# Patient Record
Sex: FEMALE | Race: WHITE | NOT HISPANIC OR LATINO | Employment: OTHER | URBAN - METROPOLITAN AREA
[De-identification: names, ages, dates, MRNs, and addresses within clinical notes are randomized per-mention and may not be internally consistent; named-entity substitution may affect disease eponyms.]

---

## 2017-01-26 ENCOUNTER — HOSPITAL ENCOUNTER (EMERGENCY)
Facility: CLINIC | Age: 62
Discharge: HOME | End: 2017-01-26
Attending: EMERGENCY MEDICINE | Admitting: EMERGENCY MEDICINE
Payer: COMMERCIAL

## 2017-01-26 VITALS
RESPIRATION RATE: 16 BRPM | HEIGHT: 65 IN | HEART RATE: 88 BPM | TEMPERATURE: 97.8 F | DIASTOLIC BLOOD PRESSURE: 79 MMHG | OXYGEN SATURATION: 100 % | SYSTOLIC BLOOD PRESSURE: 144 MMHG

## 2017-01-26 DIAGNOSIS — R55 SYNCOPE, UNSPECIFIED SYNCOPE TYPE: ICD-10-CM

## 2017-01-26 LAB
ALBUMIN UR-MCNC: NEGATIVE MG/DL
ANION GAP SERPL CALCULATED.3IONS-SCNC: 7 MMOL/L (ref 3–14)
APPEARANCE UR: CLEAR
BASOPHILS # BLD AUTO: 0 10E9/L (ref 0–0.2)
BASOPHILS NFR BLD AUTO: 0.1 %
BILIRUB UR QL STRIP: NEGATIVE
BUN SERPL-MCNC: 12 MG/DL (ref 7–30)
CALCIUM SERPL-MCNC: 8.6 MG/DL (ref 8.5–10.1)
CHLORIDE SERPL-SCNC: 102 MMOL/L (ref 94–109)
CO2 SERPL-SCNC: 28 MMOL/L (ref 20–32)
COLOR UR AUTO: NORMAL
CREAT SERPL-MCNC: 0.81 MG/DL (ref 0.52–1.04)
DIFFERENTIAL METHOD BLD: NORMAL
EOSINOPHIL # BLD AUTO: 0 10E9/L (ref 0–0.7)
EOSINOPHIL NFR BLD AUTO: 0.4 %
ERYTHROCYTE [DISTWIDTH] IN BLOOD BY AUTOMATED COUNT: 12.4 % (ref 10–15)
GFR SERPL CREATININE-BSD FRML MDRD: 72 ML/MIN/1.7M2
GLUCOSE SERPL-MCNC: 111 MG/DL (ref 70–99)
GLUCOSE UR STRIP-MCNC: NEGATIVE MG/DL
HCT VFR BLD AUTO: 41.8 % (ref 35–47)
HGB BLD-MCNC: 14.5 G/DL (ref 11.7–15.7)
HGB UR QL STRIP: NEGATIVE
IMM GRANULOCYTES # BLD: 0 10E9/L (ref 0–0.4)
IMM GRANULOCYTES NFR BLD: 0.1 %
INTERPRETATION ECG - MUSE: NORMAL
KETONES UR STRIP-MCNC: NEGATIVE MG/DL
LEUKOCYTE ESTERASE UR QL STRIP: NEGATIVE
LYMPHOCYTES # BLD AUTO: 2.3 10E9/L (ref 0.8–5.3)
LYMPHOCYTES NFR BLD AUTO: 33.3 %
MCH RBC QN AUTO: 31.5 PG (ref 26.5–33)
MCHC RBC AUTO-ENTMCNC: 34.7 G/DL (ref 31.5–36.5)
MCV RBC AUTO: 91 FL (ref 78–100)
MONOCYTES # BLD AUTO: 0.3 10E9/L (ref 0–1.3)
MONOCYTES NFR BLD AUTO: 4.3 %
NEUTROPHILS # BLD AUTO: 4.2 10E9/L (ref 1.6–8.3)
NEUTROPHILS NFR BLD AUTO: 61.8 %
NITRATE UR QL: NEGATIVE
NRBC # BLD AUTO: 0 10*3/UL
NRBC BLD AUTO-RTO: 0 /100
PH UR STRIP: 7 PH (ref 5–7)
PLATELET # BLD AUTO: 190 10E9/L (ref 150–450)
POTASSIUM SERPL-SCNC: 4.1 MMOL/L (ref 3.4–5.3)
RBC # BLD AUTO: 4.6 10E12/L (ref 3.8–5.2)
SODIUM SERPL-SCNC: 137 MMOL/L (ref 133–144)
SP GR UR STRIP: 1 (ref 1–1.03)
TSH SERPL DL<=0.005 MIU/L-ACNC: 1.49 MU/L (ref 0.4–4)
URN SPEC COLLECT METH UR: NORMAL
UROBILINOGEN UR STRIP-MCNC: NORMAL MG/DL (ref 0–2)
WBC # BLD AUTO: 6.8 10E9/L (ref 4–11)

## 2017-01-26 PROCEDURE — 96360 HYDRATION IV INFUSION INIT: CPT

## 2017-01-26 PROCEDURE — 93005 ELECTROCARDIOGRAM TRACING: CPT

## 2017-01-26 PROCEDURE — 25000128 H RX IP 250 OP 636: Performed by: EMERGENCY MEDICINE

## 2017-01-26 PROCEDURE — 84443 ASSAY THYROID STIM HORMONE: CPT | Performed by: EMERGENCY MEDICINE

## 2017-01-26 PROCEDURE — 80048 BASIC METABOLIC PNL TOTAL CA: CPT | Performed by: EMERGENCY MEDICINE

## 2017-01-26 PROCEDURE — 99284 EMERGENCY DEPT VISIT MOD MDM: CPT | Mod: 25

## 2017-01-26 PROCEDURE — 85025 COMPLETE CBC W/AUTO DIFF WBC: CPT | Performed by: EMERGENCY MEDICINE

## 2017-01-26 PROCEDURE — 81003 URINALYSIS AUTO W/O SCOPE: CPT | Performed by: EMERGENCY MEDICINE

## 2017-01-26 RX ADMIN — SODIUM CHLORIDE 1000 ML: 9 INJECTION, SOLUTION INTRAVENOUS at 07:04

## 2017-01-26 NOTE — ED AVS SNAPSHOT
Emergency Department    [address] [facility] [address]    Phone:  [phone]    Fax:  [phone]                                       Tatianna Leo   MRN: [id number]    Department:   Emergency Department   Date of Visit:  1/26/2017           After Visit Summary Signature Page     I have received my discharge instructions, and my questions have been answered. I have discussed any challenges I see with this plan with the nurse or doctor.    ..........................................................................................................................................  Patient/Patient Representative Signature      ..........................................................................................................................................  Patient Representative Print Name and Relationship to Patient    ..................................................               ................................................  Date                                            Time    ..........................................................................................................................................  Reviewed by Signature/Title    ...................................................              ..............................................  Date                                                            Time

## 2017-01-26 NOTE — DISCHARGE INSTRUCTIONS
Causes of Syncope  Syncope (fainting) has many causes. Sometimes it is not serious. In other cases, syncope is a sign of a heart problem. But treatment can help    When syncope is not serious  Your doctor may call your problem vasovagal syncope or orthostatic hypotension. These 2 types of syncope are not serious. They can be caused by:    Strong feelings, such as anxiety or fear. A nerve signal may briefly change your heart rate and lower your blood pressure too much.    Standing for too long. Standing may cause blood to pool in your legs. When this happens, your brain may not receive all the blood it needs.    Standing up too quickly. Your blood pressure may not adjust fast enough to changes in posture and may drop too low. Certain medications can also cause this problem.  When heart trouble causes syncope  A heart problem can decrease the amount of oxygen-rich blood that reaches the brain. Heart trouble can be serious and may even be fatal if left untreated:    A slow heart rate. Electrical signals tell the chambers of the heart when to pump. But the signals may be slowed or blocked (heart block) as they travel on the heart s pathways. This can be caused by aging, scarred heart tissue, or damage from heart disease. When the heart rate slows, not enough blood is pumped.    A fast heart rate. Certain problems can make the heart race. For instance, after a heart attack, also known as acute myocardial infarction, or AMI, abnormal electrical signals may be created. These signals can make the heart suddenly beat very fast. The heart pumps before the chambers can fill with blood. So less blood reaches the brain and other parts of the body. Illegal drugs, certain medications, heart disease, or an inherited condition can also cause this.    A heart valve problem. Blood travels through the chambers of the heart as it is pumped. Heart valves open and close to help move blood in the right direction. But a valve may not open  or close fully, if it s hardened or scarred. As a result, less blood is pumped through the heart to the brain and body.    2575-6308 [employer]. [address] [place] [address], [place] [address]. All rights reserved. This information is not intended as a substitute for professional medical care. Always follow your healthcare professional's instructions.          Fainting: Uncertain Cause  Fainting (syncope) is a temporary loss of consciousness. It s also called passing out. It occurs when blood flow to the brain is less than normal. Near-fainting (near-syncope) is very similar to fainting, but you don t fully pass out.  Common minor causes of fainting include:    Sudden fear    Pain    Nausea    Emotional stress    Overexertion  Suddenly standing up after sitting or lying for a long time can also cause fainting.  More serious causes of fainting include:    Very slow or very fast heartbeat (arrhythmia)    Other types of heart disease    Dehydration    Loss of blood loss    Seizure    Stroke    Ruptured blood vessel in the brain  Taking too much high blood pressure medicine can also cause low blood pressure and fainting.  Your health care provider does not know the exact cause of your fainting. But the tests today did not show any of the serious causes of fainting. Sometimes you may need more tests to find out if you have a serious problem. That s why it s important to follow up with your provider as advised.  Home care  Follow these guidelines when caring for yourself at home:    Rest today. You may go back to your normal activities when you are feeling back to normal. It is best to stay with someone who can check on you for the next 24 hours to watch for another episode of fainting.    If you become lightheaded or dizzy, lie down right away. Or sit with your head between your knees.    Because the provider doesn t know the exact cause of your fainting or near-fainting spell, it s possible for you to have  another spell without warning. Because of this, don t drive a car or operate dangerous equipment. Don t take a bath alone. Use a shower instead. Don t swim alone until your health care provider says that you are no longer in danger of having another fainting spell.  Follow-up care  Follow up with your health care provider, or as advised.  When to seek medical advice  Call your health care provider right away if any of these occur:    Another fainting spell that s not explained by the common causes listed above    Pain in your chest, arm, neck, jaw, back, or abdomen    Shortness of breath    Severe headache or seizure    Blood in vomit or stools (black or red color)    Unexpected vaginal bleeding    Your heart beats very rapidly, very slowly, or irregularly (palpitations)  Also call your provider if you have signs of stroke:    Weakness in an arm or leg or on one side of the face    Difficulty speaking or seeing    Extreme drowsiness, confusion, dizziness, or fainting    4435-4861 [employer]. [address] [place] [address], [place] [address]. All rights reserved. This information is not intended as a substitute for professional medical care. Always follow your healthcare professional's instructions.

## 2017-01-26 NOTE — ED AVS SNAPSHOT
Emergency Department    [address] [facility] [address]    Phone:  [phone]    Fax:  [phone]                                       Tatianna Leo   MRN: [id number]    Department:   Emergency Department   Date of Visit:  1/26/2017           Patient Information     Date Of Birth          1955        Your diagnoses for this visit were:     Syncope, unspecified syncope type        You were seen by Reta Cooper MD.      Follow-up Information     Follow up with Rekha, Jeremy Marcial.    Contact information:    [address] [place] [id number] [phone]          Follow up with  Emergency Department.    Specialty:  EMERGENCY MEDICINE    Why:  If symptoms worsen    Contact information:    [address] [facility] [id number] [phone]        Discharge Instructions         Causes of Syncope  Syncope (fainting) has many causes. Sometimes it is not serious. In other cases, syncope is a sign of a heart problem. But treatment can help    When syncope is not serious  Your doctor may call your problem vasovagal syncope or orthostatic hypotension. These 2 types of syncope are not serious. They can be caused by:    Strong feelings, such as anxiety or fear. A nerve signal may briefly change your heart rate and lower your blood pressure too much.    Standing for too long. Standing may cause blood to pool in your legs. When this happens, your brain may not receive all the blood it needs.    Standing up too quickly. Your blood pressure may not adjust fast enough to changes in posture and may drop too low. Certain medications can also cause this problem.  When heart trouble causes syncope  A heart problem can decrease the amount of oxygen-rich blood that reaches the brain. Heart trouble can be serious and may even be fatal if left untreated:    A slow heart rate. Electrical signals tell the chambers of the heart when to pump. But the signals may be slowed or  blocked (heart block) as they travel on the heart s pathways. This can be caused by aging, scarred heart tissue, or damage from heart disease. When the heart rate slows, not enough blood is pumped.    A fast heart rate. Certain problems can make the heart race. For instance, after a heart attack, also known as acute myocardial infarction, or AMI, abnormal electrical signals may be created. These signals can make the heart suddenly beat very fast. The heart pumps before the chambers can fill with blood. So less blood reaches the brain and other parts of the body. Illegal drugs, certain medications, heart disease, or an inherited condition can also cause this.    A heart valve problem. Blood travels through the chambers of the heart as it is pumped. Heart valves open and close to help move blood in the right direction. But a valve may not open or close fully, if it s hardened or scarred. As a result, less blood is pumped through the heart to the brain and body.    3569-4115 [employer]. [address] [place] [address]. All rights reserved. This information is not intended as a substitute for professional medical care. Always follow your healthcare professional's instructions.          Fainting: Uncertain Cause  Fainting (syncope) is a temporary loss of consciousness. It s also called passing out. It occurs when blood flow to the brain is less than normal. Near-fainting (near-syncope) is very similar to fainting, but you don t fully pass out.  Common minor causes of fainting include:    Sudden fear    Pain    Nausea    Emotional stress    Overexertion  Suddenly standing up after sitting or lying for a long time can also cause fainting.  More serious causes of fainting include:    Very slow or very fast heartbeat (arrhythmia)    Other types of heart disease    Dehydration    Loss of blood loss    Seizure    Stroke    Ruptured blood vessel in the brain  Taking too much high blood pressure medicine  can also cause low blood pressure and fainting.  Your health care provider does not know the exact cause of your fainting. But the tests today did not show any of the serious causes of fainting. Sometimes you may need more tests to find out if you have a serious problem. That s why it s important to follow up with your provider as advised.  Home care  Follow these guidelines when caring for yourself at home:    Rest today. You may go back to your normal activities when you are feeling back to normal. It is best to stay with someone who can check on you for the next 24 hours to watch for another episode of fainting.    If you become lightheaded or dizzy, lie down right away. Or sit with your head between your knees.    Because the provider doesn t know the exact cause of your fainting or near-fainting spell, it s possible for you to have another spell without warning. Because of this, don t drive a car or operate dangerous equipment. Don t take a bath alone. Use a shower instead. Don t swim alone until your health care provider says that you are no longer in danger of having another fainting spell.  Follow-up care  Follow up with your health care provider, or as advised.  When to seek medical advice  Call your health care provider right away if any of these occur:    Another fainting spell that s not explained by the common causes listed above    Pain in your chest, arm, neck, jaw, back, or abdomen    Shortness of breath    Severe headache or seizure    Blood in vomit or stools (black or red color)    Unexpected vaginal bleeding    Your heart beats very rapidly, very slowly, or irregularly (palpitations)  Also call your provider if you have signs of stroke:    Weakness in an arm or leg or on one side of the face    Difficulty speaking or seeing    Extreme drowsiness, confusion, dizziness, or fainting    5747-4432 [employer]. [address] [place] [address], [place] [address]. All rights reserved. This  information is not intended as a substitute for professional medical care. Always follow your healthcare professional's instructions.          24 Hour Appointment Hotline       To make an appointment at any [facility], call [phone] ([phone]). If you don't have a family doctor or clinic, we will help you find one. [place] clinics are conveniently located to serve the needs of you and your family.             Review of your medicines      Our records show that you are taking the medicines listed below. If these are incorrect, please call your family doctor or clinic.        Dose / Directions Last dose taken    ALEVE PO        Refills:  0        MULTIVITAMIN GUMMIES ADULT PO        Refills:  0        TYLENOL PO        Refills:  0                Procedures and tests performed during your visit     Basic metabolic panel    CBC with platelets differential    EKG 12-lead, tracing only    TSH with free T4 reflex    UA reflex to Microscopic and Culture      Orders Needing Specimen Collection     None      Pending Results     No orders found from 1/25/2017 to 1/27/2017.            Pending Culture Results     No orders found from 1/25/2017 to 1/27/2017.       Test Results from your hospital stay           1/26/2017  7:11 AM - Interface, iTagged Results      Component Results     Component Value Ref Range & Units Status    WBC 6.8 4.0 - 11.0 10e9/L Final    RBC Count 4.60 3.8 - 5.2 10e12/L Final    Hemoglobin 14.5 11.7 - 15.7 g/dL Final    Hematocrit 41.8 35.0 - 47.0 % Final    MCV 91 78 - 100 fl Final    MCH 31.5 26.5 - 33.0 pg Final    MCHC 34.7 31.5 - 36.5 g/dL Final    RDW 12.4 10.0 - 15.0 % Final    Platelet Count 190 150 - 450 10e9/L Final    Diff Method Automated Method  Final    % Neutrophils 61.8 % Final    % Lymphocytes 33.3 % Final    % Monocytes 4.3 % Final    % Eosinophils 0.4 % Final    % Basophils 0.1 % Final    % Immature Granulocytes 0.1 % Final    Nucleated RBCs 0 0 /100 Final    Absolute  Neutrophil 4.2 1.6 - 8.3 10e9/L Final    Absolute Lymphocytes 2.3 0.8 - 5.3 10e9/L Final    Absolute Monocytes 0.3 0.0 - 1.3 10e9/L Final    Absolute Eosinophils 0.0 0.0 - 0.7 10e9/L Final    Absolute Basophils 0.0 0.0 - 0.2 10e9/L Final    Abs Immature Granulocytes 0.0 0 - 0.4 10e9/L Final    Absolute Nucleated RBC 0.0  Final         1/26/2017  7:28 AM - Interface, Flexilab Results      Component Results     Component Value Ref Range & Units Status    Sodium 137 133 - 144 mmol/L Final    Potassium 4.1 3.4 - 5.3 mmol/L Final    Chloride 102 94 - 109 mmol/L Final    Carbon Dioxide 28 20 - 32 mmol/L Final    Anion Gap 7 3 - 14 mmol/L Final    Glucose 111 (H) 70 - 99 mg/dL Final    Urea Nitrogen 12 7 - 30 mg/dL Final    Creatinine 0.81 0.52 - 1.04 mg/dL Final    GFR Estimate 72 >60 mL/min/1.7m2 Final    Non  GFR Calc    GFR Estimate If Black 87 >60 mL/min/1.7m2 Final    African American GFR Calc    Calcium 8.6 8.5 - 10.1 mg/dL Final         1/26/2017  7:56 AM - Interface, Flexilab Results      Component Results     Component Value Ref Range & Units Status    Color Urine Light Yellow  Final    Appearance Urine Clear  Final    Glucose Urine Negative NEG mg/dL Final    Bilirubin Urine Negative NEG Final    Ketones Urine Negative NEG mg/dL Final    Specific Gravity Urine 1.004 1.003 - 1.035 Final    Blood Urine Negative NEG Final    pH Urine 7.0 5.0 - 7.0 pH Final    Protein Albumin Urine Negative NEG mg/dL Final    Urobilinogen mg/dL Normal 0.0 - 2.0 mg/dL Final    Nitrite Urine Negative NEG Final    Leukocyte Esterase Urine Negative NEG Final    Source Midstream Urine  Final         1/26/2017  7:36 AM - Interface, Flexilab Results      Component Results     Component Value Ref Range & Units Status    TSH 1.49 0.40 - 4.00 mU/L Final                Clinical Quality Measure: Blood Pressure Screening     Your blood pressure was checked while you were in the emergency department today. The last reading we  "obtained was  BP: 144/79 mmHg . Please read the guidelines below about what these numbers mean and what you should do about them.  If your systolic blood pressure (the top number) is less than 120 and your diastolic blood pressure (the bottom number) is less than 80, then your blood pressure is normal. There is nothing more that you need to do about it.  If your systolic blood pressure (the top number) is 120-139 or your diastolic blood pressure (the bottom number) is 80-89, your blood pressure may be higher than it should be. You should have your blood pressure rechecked within a year by a primary care provider.  If your systolic blood pressure (the top number) is 140 or greater or your diastolic blood pressure (the bottom number) is 90 or greater, you may have high blood pressure. High blood pressure is treatable, but if left untreated over time it can put you at risk for heart attack, stroke, or kidney failure. You should have your blood pressure rechecked by a primary care provider within the next 4 weeks.  If your provider in the emergency department today gave you specific instructions to follow-up with your doctor or provider even sooner than that, you should follow that instruction and not wait for up to 4 weeks for your follow-up visit.        Thank you for choosing [facility]       Thank you for choosing [facility] for your care. Our goal is always to provide you with excellent care. Hearing back from our patients is one way we can continue to improve our services. Please take a few minutes to complete the written survey that you may receive in the mail after you visit with us. Thank you!        [employer] Information     [employer] lets you send messages to your doctor, view your test results, renew your prescriptions, schedule appointments and more. To sign up, go to [email or web address] . Click on \"Log in\" on the left side of the screen, which will take you to the Welcome page. Then click on \"Sign up Now\" on " the right side of the page.     You will be asked to enter the access code listed below, as well as some personal information. Please follow the directions to create your username and password.     Your access code is: [email or web address]  Expires: 2017  8:03 AM     Your access code will  in 90 days. If you need help or a new code, please call your [facility] clinic or [phone].        Care EveryWhere ID     This is your Care EveryWhere ID. This could be used by other organizations to access your [facility] medical records  [id number]        After Visit Summary       This is your record. Keep this with you and show to your community pharmacist(s) and doctor(s) at your next visit.

## 2017-01-26 NOTE — ED PROVIDER NOTES
"  History     Chief Complaint:  Loss of Consciousness      HPI   Tatianna Leo is a 61 year old female who presents with loss of consciousness. 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 The patient does not remember the episode. The patient's co-worker states that the patient did not hit her head and sat down and had her head down on a table after the episode and looked pale, was shaking, had an irregular pulse, and cold skin. The patient also kept asking \"what happened?\" per co-worker. The patient notes that she had a similar about 14 months ago when she was outside and woke up on the ground feeling fine afterwards.  She denies headache, chest pain, abdominal pain or back pain prior to or following syncope episode.  Per bystander no post ictal period and no convulsions noted.      Allergies:  Amoxicillin - swelling     Medications:    Multivitamin  Vitamin D  Probiotic  Naproxen  Acetaminophen     Past Medical History:    History reviewed.  No significant past medical history.     Past Surgical History:    Hysterectomy  Cholecystectomy    Family History:    History reviewed. No pertinent family history.    Social History:  Marital Status:   Presents to the ED with co-worker  Tobacco Use: 1 pack daily  Alcohol Use: No     Review of Systems   Skin: Positive for pallor.   Neurological: Positive for syncope.   All other systems reviewed and are negative.    Physical Exam   First Vitals:  BP: (!) 149/115 mmHg  Heart Rate: 88   Resp: 13  SpO2: 95% RA  Temp: 97.8  F (oral)        Physical Exam  General: Patient is alert and normal appearing.  HEENT: Head atraumatic    Eyes: pupils equal and reactive. Conjunctiva clear   Nares: patent   Oropharynx: no lesions, uvula midline, no palatal draping, normal voice, no trismus  Neck: Supple without lymphadenopathy, no meningismus  Chest: Heart regular rate and rhythm. "   Lungs: Equal clear to auscultation with no wheeze or rales  Abdomen: Soft, non tender, nondistended, normal bowel sounds  Back: No costovertebral angle tenderness, no midline C, T or L spine tenderness  Neuro: Grossly nonfocal, normal speech, strength equal bilaterally, CN 2-12 intact  Extremities: No deformities, equal radial and DP pulses. No clubbing, cyanosis.  No edema  Skin: Warm and dry with no rash.       Emergency Department Course   ECG:  @ 0654  Indication: loss of consciousness  Vent. Rate 67 bpm. NJ interval 144 ms. QRS duration 82 ms. QT/QTc 392/414 ms. P-R-T axis 83 90 71.   Normal sinus rhythm. Rightward axis. Pulmonary disease pattern. Abnormal ECG.   Read @ 0707 by Dr. Cooper.    Laboratory:  CBC: WBC 6.8, HGB 14.5, , WNL  BMP: Glc (H), Rest WNL (Creatinine 0.81)  TSH with free T4 reflex: 1.49    UA: Clear light yellow urine, WNL     Interventions:  (0704) Normal Saline, 1 liter, IV bolus     ED Course:  Nursing notes and past medical history reviewed.   I performed a physical examination of the patient as documented above.  I explained the plan with the patient who consents to this.   EKG was done, interpretation as above.  Blood was drawn from the patient. This was sent for laboratory testing, findings above.   Urine sample was obtained and sent for laboratory analysis, findings above.  (7333) Rechecked the patient.  Findings and plan explained to the patient. Patient discharged home with instructions regarding supportive care, medications, and reasons to return. The importance of close follow-up was reviewed.     Impression & Plan    Medical Decision Making:  Tatianna Leo is a 61 year old female who presents to the emergency department after having a syncopal episode at work. 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 She states that she got to work feeling fine, woke up feeling fine, and had not eaten breakfast but had coffee and water. Report from bystander states that she put  her head down on the table but she does not recall feeling dizzy or light headed and then them helping her to the ground after she passed out. She passed out for a matter of seconds and then woke up. There was no postictal, no seizure like activity. She states that she is back to her baseline and feels completely normal now. She has had one similar episode to this in the past. Again, with minimal if any kind of prodrome. The nurse that come to evaluate her thought that she had an irregular heart rhythm but while here has exhibited a normal heart rate and regular rhythm. Hemodynamically she has been stable, she feels like she is at baseline. Her urinalysis, CBC, BNP are all within acceptable limits as is her TSH. Given the report of an irregular heart rhythm, she was offered a Holter monitor but declines that. She has had no exertional syncope that she can identify. No dysrhythmia seen. I do not hear any murmur on her examination to suggest aortic stenosis or cause of syncope. This is not consistent with neurogenic from aneurysm based on history and examination or stroke or seizure. I think this patient is safe for discharge at this time. She is undergoing an extensive work up with her primary doctor for weight loss that was unintentional and has had recent mammogram as scheduled for colonoscopy and recent laboratory testing. I asked her to follow up with her doctor if she changes her mind about wearing a Holter monitor, certainly if she has further syncopal episodes in the next week or two she should return for reevaluation and if she ever has exertional syncope she should be seen immediately. She expressed agreement and understanding and all questions and concerns were addressed.    Diagnosis:    ICD-10-CM    1. Syncope, unspecified syncope type R55        Disposition:   Discharge to home.      Kaylynn VILLA, ana serving as a scribe on 1/26/2017 at 6:54 AM to personally document services performed by Reta Cooper  MD Amanda, based on my observations and the provider's statements to me.      Reta Cooper MD  01/26/17 [id number]

## 2021-05-16 ENCOUNTER — APPOINTMENT (OUTPATIENT)
Dept: CT IMAGING | Facility: CLINIC | Age: 66
End: 2021-05-16
Attending: PHYSICIAN ASSISTANT
Payer: COMMERCIAL

## 2021-05-16 ENCOUNTER — HOSPITAL ENCOUNTER (EMERGENCY)
Facility: CLINIC | Age: 66
Discharge: HOME | End: 2021-05-16
Attending: PHYSICIAN ASSISTANT | Admitting: PHYSICIAN ASSISTANT
Payer: COMMERCIAL

## 2021-05-16 VITALS
TEMPERATURE: 97.9 F | BODY MASS INDEX: 21.34 KG/M2 | HEIGHT: 64 IN | DIASTOLIC BLOOD PRESSURE: 77 MMHG | RESPIRATION RATE: 16 BRPM | OXYGEN SATURATION: 94 % | WEIGHT: 125 LBS | HEART RATE: 90 BPM | SYSTOLIC BLOOD PRESSURE: 168 MMHG

## 2021-05-16 DIAGNOSIS — S22.31XA CLOSED FRACTURE OF ONE RIB OF RIGHT SIDE, INITIAL ENCOUNTER: ICD-10-CM

## 2021-05-16 LAB
CREAT BLD-MCNC: 0.8 MG/DL (ref 0.52–1.04)
GFR SERPL CREATININE-BSD FRML MDRD: 72 ML/MIN/{1.73_M2}

## 2021-05-16 PROCEDURE — 96374 THER/PROPH/DIAG INJ IV PUSH: CPT | Mod: 59

## 2021-05-16 PROCEDURE — 250N000011 HC RX IP 250 OP 636: Performed by: PHYSICIAN ASSISTANT

## 2021-05-16 PROCEDURE — 250N000009 HC RX 250: Performed by: PHYSICIAN ASSISTANT

## 2021-05-16 PROCEDURE — 82565 ASSAY OF CREATININE: CPT

## 2021-05-16 PROCEDURE — 71260 CT THORAX DX C+: CPT

## 2021-05-16 PROCEDURE — 99285 EMERGENCY DEPT VISIT HI MDM: CPT | Mod: 25

## 2021-05-16 RX ORDER — KETOROLAC TROMETHAMINE 15 MG/ML
15 INJECTION, SOLUTION INTRAMUSCULAR; INTRAVENOUS ONCE
Status: COMPLETED | OUTPATIENT
Start: 2021-05-16 | End: 2021-05-16

## 2021-05-16 RX ORDER — OXYCODONE HYDROCHLORIDE 5 MG/1
5 TABLET ORAL EVERY 6 HOURS PRN
Qty: 6 TABLET | Refills: 0 | Status: SHIPPED | OUTPATIENT
Start: 2021-05-16 | End: 2022-08-22

## 2021-05-16 RX ORDER — IOPAMIDOL 755 MG/ML
80 INJECTION, SOLUTION INTRAVASCULAR ONCE
Status: COMPLETED | OUTPATIENT
Start: 2021-05-16 | End: 2021-05-16

## 2021-05-16 RX ORDER — LIDOCAINE 50 MG/G
1 PATCH TOPICAL EVERY 24 HOURS
Qty: 10 PATCH | Refills: 0 | Status: SHIPPED | OUTPATIENT
Start: 2021-05-16 | End: 2021-05-26

## 2021-05-16 RX ADMIN — KETOROLAC TROMETHAMINE 15 MG: 15 INJECTION, SOLUTION INTRAMUSCULAR; INTRAVENOUS at 13:57

## 2021-05-16 RX ADMIN — IOPAMIDOL 80 ML: 755 INJECTION, SOLUTION INTRAVENOUS at 14:11

## 2021-05-16 RX ADMIN — SODIUM CHLORIDE 70 ML: 9 INJECTION, SOLUTION INTRAVENOUS at 14:11

## 2021-05-16 ASSESSMENT — ENCOUNTER SYMPTOMS
SHORTNESS OF BREATH: 1
ROS SKIN COMMENTS: NO OPEN WOUND
ABDOMINAL PAIN: 0

## 2021-05-16 ASSESSMENT — MIFFLIN-ST. JEOR: SCORE: 1107

## 2021-05-16 NOTE — ED TRIAGE NOTES
Was out fishing on a boat while sitting on captain's chair, chair broke while casting fishing pole and fell hit right flank area on a bench with metal frame. Since having right rib pain.

## 2021-05-16 NOTE — ED PROVIDER NOTES
"  History   Chief Complaint:  Rib Pain     HPI   Tatianna Leo is a 65 year old female who presents with rib pain. Earlier today, the patient was sitting in a jory on a fishing boat when she cast her pole and her chair broke. She fell to the side and hit her right side on a metal bench. She denies any head trauma or abrasions. Since this incident, she has had trouble breathing and moving due to pain on her right side. She denies any abdominal pain.     Review of Systems   Respiratory: Positive for shortness of breath (due to pain).    Gastrointestinal: Negative for abdominal pain.   Musculoskeletal:        Right sided rip pain  No head trauma   Skin:        No open wound   All other systems reviewed and are negative.    Allergies:  Amoxicillin    Medications:  Aleve    Past Medical History:    Actinic keratosis  Moderate vulvar dysplasia  Tobacco use disorder     Past Surgical History:    Cholecystectomy  Hysterectomy  DEANNA and BSO  Leep procedure  Tubal ligation  Appendectomy    Family History:    Brother: Colon cancer, Heart attack, Alcoholism, Diabetes  Father: Lung cancer  Mother: Diabetes, Heart attack  Sister: Breast cancer, Heart attack, Colon cancer, Diabetes    Social History:  The patient was unaccompanied to the ED.    Physical Exam     Patient Vitals for the past 24 hrs:   BP Temp Temp src Pulse Resp SpO2 Height Weight   05/16/21 1310 (!) 168/77 97.9  F (36.6  C) Oral 90 16 94 % 1.626 m (5' 4\") 56.7 kg (125 lb)       Physical Exam  General: Alert and interactive. Appears well. Cooperative and pleasant.   Eyes: The pupils are equal and round. EOMs intact. No scleral icterus.  ENT: No abnormalities to the external nose or ears. Mucous membranes moist. Posterior oropharynx is non-erythematous.      Neck: Trachea is in the midline. No nuchal rigidity.     CV: Regular rate and rhythm. S1 and S2 normal without murmur, click, gallop or rub.   Resp: Breath sounds are clear bilaterally, without rhonchi, " wheezes, rales. Non-labored, no retractions or accessory muscle use.     GI: Abdomen is soft without distension. No tenderness to palpation. No peritoneal signs.    MS: Moving all extremities well. Good muscle tone. No tenderness to cervical, thoracic, or lumbar spine process. Tenderness to the inferior aspect of the right rib margin; no flail chest.   Skin: Warm and dry. No rash or lesions noted.  Neuro: Alert and oriented x 3. No focal neurologic deficits. Good strength and sensation in upper and lower extremities. Psych: Awake. Alert.  Normal affect. Appropriate interactions.  Lymph: No anterior or posterior cervical lymphadenopathy noted.    Emergency Department Course   Imaging:  Chest CT w IV contrast only, TRAUMA / DISSECTION  1.  Age indeterminant nondisplaced fracture of the right lateral  inferior 10th rib. No pneumothorax or effusion.  2.  Mild coronary artery calcifications.  Reading per radiology    Laboratory:  Creatinine POCT (Collected 1403): Creatinine 0.8, GFR 72    Emergency Department Course:  Reviewed:  I reviewed nursing notes, vitals, past medical history and care everywhere    Assessments:  1336 I obtained history and examined the patient as noted above.     1455 I rechecked and updated the patient regarding the imaging results, laboratory results and the plan for care.    Interventions:  1357 Toradol 15mg IV    Disposition:  The patient was discharged to home.     Impression & Plan   Medical Decision Making:  Tatianna Leo is a 65 year old female who presents for evaluation of right flank pain after falling off of the Captain's Chair and landing on a metal surface. She is having difficulty breathing and pain with movements and deep breaths. On examination, the patient has tenderness along the inferior right rib margin. There is no significant ecchymosis, lacerations, abrasions, or signs of flail chest. CT of the chest is obtained that shows no signs of displaced fracture, pneumothorax,  or hemothorax. She does have an age-indeterminate nondisplaced fracture of the 10th rib. I will send her home with an incentive spirometer as well as lidocaine patches to use for pain. She was given a small prescription for oxycodone to use for breakthrough pain. I discussed the healing process with rib fractures, and that she may have pain for an extended period of time. She will follow with primary care this week for recheck to ensure improvement. Patient is vitally stable at this time without any hypoxia, and she is stable for discharge home. She will return for worsening breathing, increased pain, persistent vomiting, hemoptysis, or other concerns.    Diagnosis:    ICD-10-CM    1. Closed fracture of one rib of right side, initial encounter  S22.31XA        Discharge Medications:  New Prescriptions    LIDOCAINE (LIDODERM) 5 % PATCH    Place 1 patch onto the skin every 24 hours for 10 days    OXYCODONE (ROXICODONE) 5 MG TABLET    Take 1 tablet (5 mg) by mouth every 6 hours as needed for pain       Scribe Disclosure:  I, Yaya Barrera, am serving as a scribe at 1:32 PM on 5/16/2021 to document services personally performed by Kandice Chen PA based on my observations and the provider's statements to me.      Kandice Chen PA-C  05/16/21 2329

## 2021-05-16 NOTE — DISCHARGE INSTRUCTIONS
Continue using Ibuprofen/Tylenol for pain.   Use Oxycodone as needed for breakthrough pain.   Use Lidocaine patches for pain as well.

## 2021-09-03 ENCOUNTER — HOSPITAL ENCOUNTER (INPATIENT)
Facility: CLINIC | Age: 66
LOS: 2 days | Discharge: HOME | End: 2021-09-05
Attending: EMERGENCY MEDICINE | Admitting: INTERNAL MEDICINE
Payer: COMMERCIAL

## 2021-09-03 ENCOUNTER — APPOINTMENT (OUTPATIENT)
Dept: GENERAL RADIOLOGY | Facility: CLINIC | Age: 66
End: 2021-09-03
Attending: EMERGENCY MEDICINE
Payer: COMMERCIAL

## 2021-09-03 DIAGNOSIS — R06.89 RESPIRATORY INSUFFICIENCY: ICD-10-CM

## 2021-09-03 DIAGNOSIS — J44.1 COPD EXACERBATION (H): ICD-10-CM

## 2021-09-03 LAB
ALBUMIN SERPL-MCNC: 3.8 G/DL (ref 3.4–5)
ALBUMIN UR-MCNC: 50 MG/DL
ALP SERPL-CCNC: 63 U/L (ref 40–150)
ALT SERPL W P-5'-P-CCNC: 33 U/L (ref 0–50)
ANION GAP SERPL CALCULATED.3IONS-SCNC: 6 MMOL/L (ref 3–14)
APPEARANCE UR: CLEAR
AST SERPL W P-5'-P-CCNC: 29 U/L (ref 0–45)
ATRIAL RATE - MUSE: 117 BPM
BASE EXCESS BLDV CALC-SCNC: 0.5 MMOL/L (ref -7.7–1.9)
BASOPHILS # BLD AUTO: 0.1 10E3/UL (ref 0–0.2)
BASOPHILS NFR BLD AUTO: 0 %
BILIRUB SERPL-MCNC: 0.7 MG/DL (ref 0.2–1.3)
BILIRUB UR QL STRIP: NEGATIVE
BUN SERPL-MCNC: 20 MG/DL (ref 7–30)
C PNEUM DNA SPEC QL NAA+PROBE: NOT DETECTED
CALCIUM SERPL-MCNC: 8.8 MG/DL (ref 8.5–10.1)
CHLORIDE BLD-SCNC: 101 MMOL/L (ref 94–109)
CO2 SERPL-SCNC: 29 MMOL/L (ref 20–32)
COLOR UR AUTO: YELLOW
CREAT SERPL-MCNC: 0.68 MG/DL (ref 0.52–1.04)
DIASTOLIC BLOOD PRESSURE - MUSE: NORMAL MMHG
EOSINOPHIL # BLD AUTO: 0 10E3/UL (ref 0–0.7)
EOSINOPHIL NFR BLD AUTO: 0 %
ERYTHROCYTE [DISTWIDTH] IN BLOOD BY AUTOMATED COUNT: 11.9 % (ref 10–15)
FLUAV H1 2009 PAND RNA SPEC QL NAA+PROBE: NOT DETECTED
FLUAV H1 RNA SPEC QL NAA+PROBE: NOT DETECTED
FLUAV H3 RNA SPEC QL NAA+PROBE: NOT DETECTED
FLUAV RNA SPEC QL NAA+PROBE: NOT DETECTED
FLUBV RNA SPEC QL NAA+PROBE: NOT DETECTED
GFR SERPL CREATININE-BSD FRML MDRD: >90 ML/MIN/1.73M2
GLUCOSE BLD-MCNC: 111 MG/DL (ref 70–99)
GLUCOSE UR STRIP-MCNC: 300 MG/DL
HADV DNA SPEC QL NAA+PROBE: NOT DETECTED
HCO3 BLDV-SCNC: 27 MMOL/L (ref 21–28)
HCO3 BLDV-SCNC: 30 MMOL/L (ref 21–28)
HCOV PNL SPEC NAA+PROBE: NOT DETECTED
HCT VFR BLD AUTO: 44.2 % (ref 35–47)
HGB BLD-MCNC: 15.4 G/DL (ref 11.7–15.7)
HGB UR QL STRIP: NEGATIVE
HMPV RNA SPEC QL NAA+PROBE: NOT DETECTED
HOLD SPECIMEN: NORMAL
HOLD SPECIMEN: NORMAL
HPIV1 RNA SPEC QL NAA+PROBE: NOT DETECTED
HPIV2 RNA SPEC QL NAA+PROBE: NOT DETECTED
HPIV3 RNA SPEC QL NAA+PROBE: NOT DETECTED
HPIV4 RNA SPEC QL NAA+PROBE: NOT DETECTED
HYALINE CASTS: 3 /LPF
IMM GRANULOCYTES # BLD: 0.1 10E3/UL
IMM GRANULOCYTES NFR BLD: 1 %
INTERPRETATION ECG - MUSE: NORMAL
KETONES UR STRIP-MCNC: 60 MG/DL
LACTATE BLD-SCNC: 1.1 MMOL/L
LEUKOCYTE ESTERASE UR QL STRIP: ABNORMAL
LYMPHOCYTES # BLD AUTO: 2.5 10E3/UL (ref 0.8–5.3)
LYMPHOCYTES NFR BLD AUTO: 14 %
M PNEUMO DNA SPEC QL NAA+PROBE: NOT DETECTED
MAGNESIUM SERPL-MCNC: 2.9 MG/DL (ref 1.6–2.3)
MCH RBC QN AUTO: 30.4 PG (ref 26.5–33)
MCHC RBC AUTO-ENTMCNC: 34.8 G/DL (ref 31.5–36.5)
MCV RBC AUTO: 87 FL (ref 78–100)
MONOCYTES # BLD AUTO: 1.3 10E3/UL (ref 0–1.3)
MONOCYTES NFR BLD AUTO: 7 %
MUCOUS THREADS #/AREA URNS LPF: PRESENT /LPF
NEUTROPHILS # BLD AUTO: 14.4 10E3/UL (ref 1.6–8.3)
NEUTROPHILS NFR BLD AUTO: 78 %
NITRATE UR QL: NEGATIVE
NRBC # BLD AUTO: 0 10E3/UL
NRBC BLD AUTO-RTO: 0 /100
NT-PROBNP SERPL-MCNC: 101 PG/ML (ref 0–900)
O2/TOTAL GAS SETTING VFR VENT: 100 %
P AXIS - MUSE: 89 DEGREES
PCO2 BLDV: 49 MM HG (ref 40–50)
PCO2 BLDV: 56 MM HG (ref 40–50)
PH BLDV: 7.34 [PH] (ref 7.32–7.43)
PH BLDV: 7.35 [PH] (ref 7.32–7.43)
PH UR STRIP: 6 [PH] (ref 5–7)
PLATELET # BLD AUTO: 230 10E3/UL (ref 150–450)
PO2 BLDV: 44 MM HG (ref 25–47)
PO2 BLDV: 96 MM HG (ref 25–47)
POTASSIUM BLD-SCNC: 4.1 MMOL/L (ref 3.4–5.3)
PR INTERVAL - MUSE: 116 MS
PROT SERPL-MCNC: 7.8 G/DL (ref 6.8–8.8)
QRS DURATION - MUSE: 74 MS
QT - MUSE: 290 MS
QTC - MUSE: 404 MS
R AXIS - MUSE: 98 DEGREES
RBC # BLD AUTO: 5.06 10E6/UL (ref 3.8–5.2)
RBC URINE: 7 /HPF
RSV RNA SPEC QL NAA+PROBE: NOT DETECTED
RSV RNA SPEC QL NAA+PROBE: NOT DETECTED
RV+EV RNA SPEC QL NAA+PROBE: NOT DETECTED
SAO2 % BLDV: 76 % (ref 94–100)
SARS-COV-2 RNA RESP QL NAA+PROBE: NEGATIVE
SODIUM SERPL-SCNC: 136 MMOL/L (ref 133–144)
SP GR UR STRIP: 1.03 (ref 1–1.03)
SYSTOLIC BLOOD PRESSURE - MUSE: NORMAL MMHG
T AXIS - MUSE: 77 DEGREES
TROPONIN I SERPL-MCNC: <0.015 UG/L (ref 0–0.04)
UROBILINOGEN UR STRIP-MCNC: NORMAL MG/DL
VENTRICULAR RATE- MUSE: 117 BPM
WBC # BLD AUTO: 18.3 10E3/UL (ref 4–11)
WBC URINE: 44 /HPF

## 2021-09-03 PROCEDURE — 96366 THER/PROPH/DIAG IV INF ADDON: CPT

## 2021-09-03 PROCEDURE — 250N000009 HC RX 250: Performed by: EMERGENCY MEDICINE

## 2021-09-03 PROCEDURE — C9803 HOPD COVID-19 SPEC COLLECT: HCPCS

## 2021-09-03 PROCEDURE — 94640 AIRWAY INHALATION TREATMENT: CPT

## 2021-09-03 PROCEDURE — 120N000013 HC R&B IMCU

## 2021-09-03 PROCEDURE — 87633 RESP VIRUS 12-25 TARGETS: CPT | Performed by: INTERNAL MEDICINE

## 2021-09-03 PROCEDURE — 258N000003 HC RX IP 258 OP 636: Performed by: INTERNAL MEDICINE

## 2021-09-03 PROCEDURE — 87635 SARS-COV-2 COVID-19 AMP PRB: CPT | Performed by: EMERGENCY MEDICINE

## 2021-09-03 PROCEDURE — 250N000011 HC RX IP 250 OP 636: Performed by: INTERNAL MEDICINE

## 2021-09-03 PROCEDURE — 87040 BLOOD CULTURE FOR BACTERIA: CPT | Performed by: EMERGENCY MEDICINE

## 2021-09-03 PROCEDURE — 94640 AIRWAY INHALATION TREATMENT: CPT | Mod: 76

## 2021-09-03 PROCEDURE — 94660 CPAP INITIATION&MGMT: CPT

## 2021-09-03 PROCEDURE — 81001 URINALYSIS AUTO W/SCOPE: CPT | Performed by: INTERNAL MEDICINE

## 2021-09-03 PROCEDURE — 36415 COLL VENOUS BLD VENIPUNCTURE: CPT | Performed by: EMERGENCY MEDICINE

## 2021-09-03 PROCEDURE — 250N000011 HC RX IP 250 OP 636: Performed by: EMERGENCY MEDICINE

## 2021-09-03 PROCEDURE — 5A09357 ASSISTANCE WITH RESPIRATORY VENTILATION, LESS THAN 24 CONSECUTIVE HOURS, CONTINUOUS POSITIVE AIRWAY PRESSURE: ICD-10-PCS | Performed by: INTERNAL MEDICINE

## 2021-09-03 PROCEDURE — 250N000013 HC RX MED GY IP 250 OP 250 PS 637: Performed by: INTERNAL MEDICINE

## 2021-09-03 PROCEDURE — 99291 CRITICAL CARE FIRST HOUR: CPT | Mod: 25

## 2021-09-03 PROCEDURE — 87086 URINE CULTURE/COLONY COUNT: CPT | Performed by: INTERNAL MEDICINE

## 2021-09-03 PROCEDURE — 96365 THER/PROPH/DIAG IV INF INIT: CPT

## 2021-09-03 PROCEDURE — 93005 ELECTROCARDIOGRAM TRACING: CPT

## 2021-09-03 PROCEDURE — 999N000157 HC STATISTIC RCP TIME EA 10 MIN

## 2021-09-03 PROCEDURE — 71045 X-RAY EXAM CHEST 1 VIEW: CPT

## 2021-09-03 PROCEDURE — 36415 COLL VENOUS BLD VENIPUNCTURE: CPT | Performed by: INTERNAL MEDICINE

## 2021-09-03 PROCEDURE — 99223 1ST HOSP IP/OBS HIGH 75: CPT | Mod: AI | Performed by: INTERNAL MEDICINE

## 2021-09-03 PROCEDURE — 96367 TX/PROPH/DG ADDL SEQ IV INF: CPT

## 2021-09-03 PROCEDURE — 258N000001 HC RX 258: Performed by: INTERNAL MEDICINE

## 2021-09-03 PROCEDURE — 85025 COMPLETE CBC W/AUTO DIFF WBC: CPT | Performed by: EMERGENCY MEDICINE

## 2021-09-03 PROCEDURE — 83735 ASSAY OF MAGNESIUM: CPT | Performed by: INTERNAL MEDICINE

## 2021-09-03 PROCEDURE — 96375 TX/PRO/DX INJ NEW DRUG ADDON: CPT

## 2021-09-03 PROCEDURE — 250N000011 HC RX IP 250 OP 636

## 2021-09-03 PROCEDURE — 83880 ASSAY OF NATRIURETIC PEPTIDE: CPT | Performed by: EMERGENCY MEDICINE

## 2021-09-03 PROCEDURE — 82803 BLOOD GASES ANY COMBINATION: CPT | Performed by: INTERNAL MEDICINE

## 2021-09-03 PROCEDURE — 87581 M.PNEUMON DNA AMP PROBE: CPT | Performed by: INTERNAL MEDICINE

## 2021-09-03 PROCEDURE — 80053 COMPREHEN METABOLIC PANEL: CPT | Performed by: EMERGENCY MEDICINE

## 2021-09-03 PROCEDURE — 84484 ASSAY OF TROPONIN QUANT: CPT | Performed by: EMERGENCY MEDICINE

## 2021-09-03 PROCEDURE — 99292 CRITICAL CARE ADDL 30 MIN: CPT

## 2021-09-03 PROCEDURE — 82803 BLOOD GASES ANY COMBINATION: CPT

## 2021-09-03 PROCEDURE — 250N000009 HC RX 250: Performed by: INTERNAL MEDICINE

## 2021-09-03 RX ORDER — NALOXONE HYDROCHLORIDE 0.4 MG/ML
0.2 INJECTION, SOLUTION INTRAMUSCULAR; INTRAVENOUS; SUBCUTANEOUS
Status: DISCONTINUED | OUTPATIENT
Start: 2021-09-03 | End: 2021-09-05 | Stop reason: HOSPADM

## 2021-09-03 RX ORDER — IPRATROPIUM BROMIDE AND ALBUTEROL SULFATE 2.5; .5 MG/3ML; MG/3ML
3 SOLUTION RESPIRATORY (INHALATION) ONCE
Status: COMPLETED | OUTPATIENT
Start: 2021-09-03 | End: 2021-09-03

## 2021-09-03 RX ORDER — PREDNISONE 20 MG/1
40 TABLET ORAL DAILY
Status: DISCONTINUED | OUTPATIENT
Start: 2021-09-04 | End: 2021-09-05 | Stop reason: HOSPADM

## 2021-09-03 RX ORDER — METHYLPREDNISOLONE SODIUM SUCCINATE 125 MG/2ML
125 INJECTION, POWDER, LYOPHILIZED, FOR SOLUTION INTRAMUSCULAR; INTRAVENOUS EVERY 24 HOURS
Status: DISCONTINUED | OUTPATIENT
Start: 2021-09-04 | End: 2021-09-03

## 2021-09-03 RX ORDER — NITROGLYCERIN 0.4 MG/1
0.4 TABLET SUBLINGUAL EVERY 5 MIN PRN
Status: DISCONTINUED | OUTPATIENT
Start: 2021-09-03 | End: 2021-09-05 | Stop reason: HOSPADM

## 2021-09-03 RX ORDER — METHYLPREDNISOLONE SODIUM SUCCINATE 125 MG/2ML
125 INJECTION, POWDER, LYOPHILIZED, FOR SOLUTION INTRAMUSCULAR; INTRAVENOUS ONCE
Status: COMPLETED | OUTPATIENT
Start: 2021-09-03 | End: 2021-09-03

## 2021-09-03 RX ORDER — ALBUTEROL SULFATE 0.83 MG/ML
2.5 SOLUTION RESPIRATORY (INHALATION)
Status: DISCONTINUED | OUTPATIENT
Start: 2021-09-03 | End: 2021-09-05 | Stop reason: HOSPADM

## 2021-09-03 RX ORDER — ACETAMINOPHEN 650 MG/1
650 SUPPOSITORY RECTAL EVERY 6 HOURS PRN
Status: DISCONTINUED | OUTPATIENT
Start: 2021-09-03 | End: 2021-09-05 | Stop reason: HOSPADM

## 2021-09-03 RX ORDER — MAGNESIUM SULFATE HEPTAHYDRATE 40 MG/ML
2 INJECTION, SOLUTION INTRAVENOUS ONCE
Status: COMPLETED | OUTPATIENT
Start: 2021-09-03 | End: 2021-09-03

## 2021-09-03 RX ORDER — ONDANSETRON 4 MG/1
4 TABLET, ORALLY DISINTEGRATING ORAL EVERY 6 HOURS PRN
Status: DISCONTINUED | OUTPATIENT
Start: 2021-09-03 | End: 2021-09-05 | Stop reason: HOSPADM

## 2021-09-03 RX ORDER — CEFTRIAXONE 1 G/1
1 INJECTION, POWDER, FOR SOLUTION INTRAMUSCULAR; INTRAVENOUS ONCE
Status: COMPLETED | OUTPATIENT
Start: 2021-09-03 | End: 2021-09-03

## 2021-09-03 RX ORDER — METHYLPREDNISOLONE SODIUM SUCCINATE 125 MG/2ML
125 INJECTION, POWDER, LYOPHILIZED, FOR SOLUTION INTRAMUSCULAR; INTRAVENOUS EVERY 6 HOURS
Status: DISCONTINUED | OUTPATIENT
Start: 2021-09-03 | End: 2021-09-03

## 2021-09-03 RX ORDER — LIDOCAINE 40 MG/G
CREAM TOPICAL
Status: DISCONTINUED | OUTPATIENT
Start: 2021-09-03 | End: 2021-09-05 | Stop reason: HOSPADM

## 2021-09-03 RX ORDER — ALBUTEROL SULFATE 90 UG/1
1-2 AEROSOL, METERED RESPIRATORY (INHALATION) EVERY 6 HOURS PRN
COMMUNITY

## 2021-09-03 RX ORDER — LORAZEPAM 2 MG/ML
0.5 INJECTION INTRAMUSCULAR EVERY 4 HOURS PRN
Status: DISCONTINUED | OUTPATIENT
Start: 2021-09-03 | End: 2021-09-05 | Stop reason: HOSPADM

## 2021-09-03 RX ORDER — LANOLIN ALCOHOL/MO/W.PET/CERES
3 CREAM (GRAM) TOPICAL
Status: DISCONTINUED | OUTPATIENT
Start: 2021-09-03 | End: 2021-09-05 | Stop reason: HOSPADM

## 2021-09-03 RX ORDER — NALOXONE HYDROCHLORIDE 0.4 MG/ML
0.4 INJECTION, SOLUTION INTRAMUSCULAR; INTRAVENOUS; SUBCUTANEOUS
Status: DISCONTINUED | OUTPATIENT
Start: 2021-09-03 | End: 2021-09-05 | Stop reason: HOSPADM

## 2021-09-03 RX ORDER — ONDANSETRON 2 MG/ML
4 INJECTION INTRAMUSCULAR; INTRAVENOUS EVERY 6 HOURS PRN
Status: DISCONTINUED | OUTPATIENT
Start: 2021-09-03 | End: 2021-09-05 | Stop reason: HOSPADM

## 2021-09-03 RX ORDER — PROCHLORPERAZINE MALEATE 10 MG
10 TABLET ORAL EVERY 6 HOURS PRN
Status: DISCONTINUED | OUTPATIENT
Start: 2021-09-03 | End: 2021-09-05 | Stop reason: HOSPADM

## 2021-09-03 RX ORDER — LORAZEPAM 2 MG/ML
INJECTION INTRAMUSCULAR
Status: COMPLETED
Start: 2021-09-03 | End: 2021-09-03

## 2021-09-03 RX ORDER — IPRATROPIUM BROMIDE AND ALBUTEROL SULFATE 2.5; .5 MG/3ML; MG/3ML
3 SOLUTION RESPIRATORY (INHALATION)
Status: DISCONTINUED | OUTPATIENT
Start: 2021-09-03 | End: 2021-09-05 | Stop reason: HOSPADM

## 2021-09-03 RX ORDER — NICOTINE 21 MG/24HR
1 PATCH, TRANSDERMAL 24 HOURS TRANSDERMAL DAILY
Status: DISCONTINUED | OUTPATIENT
Start: 2021-09-03 | End: 2021-09-05 | Stop reason: HOSPADM

## 2021-09-03 RX ORDER — AZITHROMYCIN 500 MG/1
500 INJECTION, POWDER, LYOPHILIZED, FOR SOLUTION INTRAVENOUS EVERY 24 HOURS
Status: DISCONTINUED | OUTPATIENT
Start: 2021-09-04 | End: 2021-09-04

## 2021-09-03 RX ORDER — PROCHLORPERAZINE 25 MG
25 SUPPOSITORY, RECTAL RECTAL EVERY 12 HOURS PRN
Status: DISCONTINUED | OUTPATIENT
Start: 2021-09-03 | End: 2021-09-05 | Stop reason: HOSPADM

## 2021-09-03 RX ORDER — INDOMETHACIN 50 MG/1
50 CAPSULE ORAL 2 TIMES DAILY PRN
COMMUNITY
End: 2022-08-22

## 2021-09-03 RX ORDER — LIDOCAINE 40 MG/G
CREAM TOPICAL
Status: DISCONTINUED | OUTPATIENT
Start: 2021-09-03 | End: 2021-09-03

## 2021-09-03 RX ORDER — ACETAMINOPHEN 325 MG/1
650 TABLET ORAL EVERY 6 HOURS PRN
Status: DISCONTINUED | OUTPATIENT
Start: 2021-09-03 | End: 2021-09-05 | Stop reason: HOSPADM

## 2021-09-03 RX ORDER — HYDROMORPHONE HCL IN WATER/PF 6 MG/30 ML
0.2 PATIENT CONTROLLED ANALGESIA SYRINGE INTRAVENOUS
Status: DISCONTINUED | OUTPATIENT
Start: 2021-09-03 | End: 2021-09-05 | Stop reason: HOSPADM

## 2021-09-03 RX ORDER — CARBOXYMETHYLCELLULOSE SODIUM 5 MG/ML
1 SOLUTION/ DROPS OPHTHALMIC
Status: DISCONTINUED | OUTPATIENT
Start: 2021-09-03 | End: 2021-09-05 | Stop reason: HOSPADM

## 2021-09-03 RX ORDER — DEXTROSE MONOHYDRATE, SODIUM CHLORIDE, AND POTASSIUM CHLORIDE 50; 1.49; 9 G/1000ML; G/1000ML; G/1000ML
100 INJECTION, SOLUTION INTRAVENOUS CONTINUOUS
Status: DISCONTINUED | OUTPATIENT
Start: 2021-09-03 | End: 2021-09-04

## 2021-09-03 RX ORDER — AZITHROMYCIN 500 MG/1
500 INJECTION, POWDER, LYOPHILIZED, FOR SOLUTION INTRAVENOUS ONCE
Status: COMPLETED | OUTPATIENT
Start: 2021-09-03 | End: 2021-09-03

## 2021-09-03 RX ORDER — METHYLPREDNISOLONE SODIUM SUCCINATE 125 MG/2ML
60 INJECTION, POWDER, LYOPHILIZED, FOR SOLUTION INTRAMUSCULAR; INTRAVENOUS EVERY 6 HOURS
Status: DISCONTINUED | OUTPATIENT
Start: 2021-09-03 | End: 2021-09-03

## 2021-09-03 RX ORDER — LORAZEPAM 2 MG/ML
0.5 INJECTION INTRAMUSCULAR ONCE
Status: COMPLETED | OUTPATIENT
Start: 2021-09-03 | End: 2021-09-03

## 2021-09-03 RX ORDER — OXYCODONE HYDROCHLORIDE 5 MG/1
5 TABLET ORAL EVERY 4 HOURS PRN
Status: DISCONTINUED | OUTPATIENT
Start: 2021-09-03 | End: 2021-09-05 | Stop reason: HOSPADM

## 2021-09-03 RX ADMIN — ENOXAPARIN SODIUM 40 MG: 40 INJECTION SUBCUTANEOUS at 08:18

## 2021-09-03 RX ADMIN — IPRATROPIUM BROMIDE AND ALBUTEROL SULFATE 3 ML: .5; 3 SOLUTION RESPIRATORY (INHALATION) at 06:01

## 2021-09-03 RX ADMIN — METHYLPREDNISOLONE SODIUM SUCCINATE 125 MG: 125 INJECTION, POWDER, FOR SOLUTION INTRAMUSCULAR; INTRAVENOUS at 08:18

## 2021-09-03 RX ADMIN — NICOTINE 1 PATCH: 21 PATCH, EXTENDED RELEASE TRANSDERMAL at 08:18

## 2021-09-03 RX ADMIN — METHYLPREDNISOLONE SODIUM SUCCINATE 125 MG: 125 INJECTION, POWDER, FOR SOLUTION INTRAMUSCULAR; INTRAVENOUS at 01:40

## 2021-09-03 RX ADMIN — IPRATROPIUM BROMIDE AND ALBUTEROL SULFATE 3 ML: .5; 3 SOLUTION RESPIRATORY (INHALATION) at 11:16

## 2021-09-03 RX ADMIN — IPRATROPIUM BROMIDE AND ALBUTEROL SULFATE 3 ML: .5; 3 SOLUTION RESPIRATORY (INHALATION) at 01:41

## 2021-09-03 RX ADMIN — SODIUM CHLORIDE, POTASSIUM CHLORIDE, SODIUM LACTATE AND CALCIUM CHLORIDE 1000 ML: 600; 310; 30; 20 INJECTION, SOLUTION INTRAVENOUS at 05:25

## 2021-09-03 RX ADMIN — CEFTRIAXONE SODIUM 1 G: 1 INJECTION, POWDER, FOR SOLUTION INTRAMUSCULAR; INTRAVENOUS at 06:07

## 2021-09-03 RX ADMIN — POTASSIUM CHLORIDE, DEXTROSE MONOHYDRATE AND SODIUM CHLORIDE 100 ML/HR: 150; 5; 900 INJECTION, SOLUTION INTRAVENOUS at 16:37

## 2021-09-03 RX ADMIN — LORAZEPAM 0.5 MG: 2 INJECTION INTRAMUSCULAR at 01:51

## 2021-09-03 RX ADMIN — FLUTICASONE FUROATE AND VILANTEROL TRIFENATATE 1 PUFF: 200; 25 POWDER RESPIRATORY (INHALATION) at 11:48

## 2021-09-03 RX ADMIN — AZITHROMYCIN MONOHYDRATE 500 MG: 500 INJECTION, POWDER, LYOPHILIZED, FOR SOLUTION INTRAVENOUS at 02:15

## 2021-09-03 RX ADMIN — POTASSIUM CHLORIDE, DEXTROSE MONOHYDRATE AND SODIUM CHLORIDE 100 ML/HR: 150; 5; 900 INJECTION, SOLUTION INTRAVENOUS at 06:06

## 2021-09-03 RX ADMIN — LORAZEPAM 0.5 MG: 2 INJECTION INTRAMUSCULAR; INTRAVENOUS at 01:51

## 2021-09-03 RX ADMIN — CEFTRIAXONE SODIUM 1 G: 1 INJECTION, POWDER, FOR SOLUTION INTRAMUSCULAR; INTRAVENOUS at 02:11

## 2021-09-03 RX ADMIN — MAGNESIUM SULFATE HEPTAHYDRATE 2 G: 40 INJECTION, SOLUTION INTRAVENOUS at 01:41

## 2021-09-03 RX ADMIN — IPRATROPIUM BROMIDE AND ALBUTEROL SULFATE 3 ML: .5; 3 SOLUTION RESPIRATORY (INHALATION) at 14:58

## 2021-09-03 ASSESSMENT — ENCOUNTER SYMPTOMS
SHORTNESS OF BREATH: 1
WHEEZING: 1

## 2021-09-03 ASSESSMENT — ACTIVITIES OF DAILY LIVING (ADL)
ADLS_ACUITY_SCORE: 17
ADLS_ACUITY_SCORE: 16

## 2021-09-03 ASSESSMENT — MIFFLIN-ST. JEOR: SCORE: 1107.45

## 2021-09-03 NOTE — H&P
[facility]    History and Physical - Hospitalist Service       Date of Admission:  9/3/2021    Assessment & Plan      Tatianna Leo is a 65 year old female admitted on 9/3/2021. She presents the emergency department for progressive worsening of shortness of breath and fevers, found to be hypoxic and requiring BiPAP support to maintain oxygenation in the setting of underlying COPD and what seems to be a preceding viral illness.    Acute hypoxic hypercarbic respiratory failure: Suspect COPD exacerbation exacerbated by acute viral illness.  Poor air movement throughout lung fields, known emphysema, improvement following nebulizer treatments.  Reports she has never had a COPD exacerbation in the past.  -Continue azithromycin 500 mg IV daily given n.p.o. status  -Repeat VBG on admission as patient has been on BiPAP greater than 1 hour in ER  -BiPAP support, wean as tolerated  -IV Solu-Medrol 125 mg every 6 hours  -IV Ativan available for anxiety as well as nausea; patient did not tolerate CPAP or BiPAP prior to Ativan administration and is DNR/DNI  -Discussed importance of smoking cessation  -Scheduled duo nebs, as needed albuterol nebulizer treatments  -Resume prior to admission Brio Ellipta 200/25 daily  -Aggressive pulmonary toilet when off of BiPAP    Suspect acute viral illness: 1 week of symptoms starting with headache, subsequent fevers and shaking chills with fevers as high as 103, anorexia with decreased taste, nausea with nonbloody emesis, past 2 days with diarrhea.  Covid negative in the emergency department; patient works as a hospice nurse and tells me that she tested negative for Covid on Monday as well.  Was vaccinated against COVID-19.  -Blood cultures x2 pending  -Received ceftriaxone 2 g and azithromycin in the emergency department for possible pneumonia, though no pneumonia on chest x-ray; continuing azithromycin for COPD exacerbation, though holding further doses of  ceftriaxone  -Obtain UA with micro and reflex culture for completeness, though patient reports no urinary symptoms other than darkening of urine with decreased oral intake  -Respiratory virus panel, nasopharyngeal swab    Tobacco use disorder with nicotine dependence: Smokes approximately 1 pack/day.  Prior attempts at cold turkey cessation have failed as she becomes jittery and agitated, desires a cigarette, consistent with nicotine withdrawal.  -21 mg nicotine patch panel in place.  Discussed with patient admission  -Continue to encourage smoking cessation    History of right rib fracture: Sustained a right 10th rib fracture late May while fishing and hitting a metal bench.  Even several weeks out still with low volume on incentive spirometer by review of outside records.  Does not report any pain currently.       Diet:   N.p.o. as patient is on BiPAP for respiratory failure  DVT Prophylaxis: Enoxaparin (Lovenox) SQ  Batres Catheter: Not present  Central Lines: None  Code Status:   DNR/DNI. Confirmed with patient on admission,  at bedside.    Clinically Significant Risk Factors Present on Admission                   Disposition Plan   Expected discharge: Likely 3 to 4 days recommended to prior living arrangement once Hypoxia resolved.     The patient's care was discussed with the Patient, patient's  at bedside, Dr. Aquino in the emergency department.    Jhon Hill MD  [facility]  Securely message with the Vocera Web Console (learn more here)  Text page via [employer] Paging/Directory      ______________________________________________________________________    Chief Complaint   Fever and chills, anorexia with nausea and now diarrhea, shortness of breath and cough    History is obtained from patient, chart review, patient's  at bedside, discussion with Dr. Aquino in the emergency department, review of outside records including history of rib fractures in May with  subsequent recovery    History of Present Illness   Tatianna Leo is a 65 year old female who presents to the emergency department for worsening shortness of breath in the setting of ongoing tobacco use disorder and COPD as well as what seems to be a preceding viral illness.    Patient has chronic emphysema/COPD on Breo Ellipta and albuterol inhaler.  She continues to smoke approximately 1 pack/day.  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  1 week ago, patient developed a headache.  This was followed by fevers and chills, initially with temperatures in the 100 range, though as high as 103 approximately 4 days ago.  Patient also developed nausea with some nonbloody emesis, over the past 2 days, she has had some watery stool, once per day.  She also reports that she has lost her taste and has general anorexia, when coupled with nausea, has not eaten or drank much in the past week which is confirmed by her .  Throughout this, she has developed a cough with increased sputum production.  She tells me that she swallows her expectorant, though it is a change from her baseline smoker's cough.  She has also had increasing shortness of breath over this time.    With her viral symptoms and the fact that she was exposed to at least 2 patients with COVID-19, patient was tested for COVID-19 through her work on August 30, a day before she would have been routinely scheduled for her weekly test.  Patient and  tell me that this test returned negative for COVID-19.  She was tested again here in the emergency department, again negative for COVID-19.  Note that her symptoms fit very much with COVID-19, however.    Patient was noted to have significant hypoxia with her shortness of breath tonight, was a progressive worsening of her symptoms.  Initially did not tolerate CPAP or BiPAP, though after receiving IV Ativan, patient is tolerating BiPAP well with no complaints.  She has no pain  including no abdominal pain, no myalgias.  She tells me that her diarrhea consists of one watery bowel movement per day for the past 2 days.  She does not currently have any nausea, and she was counseled on removing BiPAP mask if she develops nausea or emesis.    In terms of her COPD, patient tells me that she has never had a COPD exacerbation in the past.  She did note improvement in her breathing following nebulizer treatments in the ER.    Chest x-ray without pneumonia, though patient did receive initial dose of ceftriaxone as well as azithromycin for pneumonia given history of fevers.  Patient is currently afebrile, though does have a leukocytosis to the 18,000 range.    Review of Systems    The 10 point Review of Systems is negative other than noted in the HPI or here.  Shaking chills  Fever  Watery stool once per day for the past 2 days  Nausea with nonbloody emesis in preceding days  Headache in preceding days  Darker urine, though no dysuria or urinary frequency  Anorexia with loss of taste  No chest pain  No pleuritic pain  Weight has been stable generally per  and patient, some recent weight loss with anorexia over the past week  No lower extremity swelling or pain    Past Medical History    I have reviewed this patient's medical history and updated it with pertinent information if needed.   COPD  Right 10th rib fracture    Past Surgical History   I have reviewed this patient's surgical history and updated it with pertinent information if needed.  Past Surgical History:   Procedure Laterality Date     CHOLECYSTECTOMY       HYSTERECTOMY with bilateral salpingo-oophorectomy in 2004     LEEP  Appendectomy    Social History   I have reviewed this patient's social history and updated it with pertinent information if needed.  Social History     Tobacco Use     Smoking status: Current Every Day Smoker     Packs/day: 1.00   Substance Use Topics     Alcohol use: No     Drug use: No       Family History      Multiple brothers with myocardial infarctions  Mother with heart disease and heart attack  Father with lung cancer  Brother and sister with colon cancer    Prior to Admission Medications   Prior to Admission Medications   Prescriptions Last Dose Informant Patient Reported? Taking?   Acetaminophen (TYLENOL PO)   Yes No   Multiple Vitamins-Minerals (MULTIVITAMIN GUMMIES ADULT PO)   Yes No   Naproxen Sodium (ALEVE PO)   Yes No   oxyCODONE (ROXICODONE) 5 MG tablet   No No   Sig: Take 1 tablet (5 mg) by mouth every 6 hours as needed for pain      Facility-Administered Medications: None   Brio Ellipta  Albuterol inhaler  Recently on indomethacin for rib fracture    Allergies   Allergies   Allergen Reactions     Amoxicillin Swelling       Physical Exam   Vital Signs: Temp: 97.3  F (36.3  C) Temp src: Oral BP: 106/62 Pulse: 100   Resp: 25 SpO2: 100 % O2 Device: BiPAP/CPAP Oxygen Delivery: 8 LPM    General Appearance: Thin 65-year-old female.  Sleepy, currently on BiPAP, though awakens and is appropriate in conversation.  She appears older than stated age  Eyes: No scleral icterus or injection  HEENT: Normocephalic, atraumatic  Respiratory: Decreased air movement throughout lung fields bilaterally, even on BiPAP.  No appreciable wheezing, no crackles  Cardiovascular: Regular rhythm, heart rate currently in the low 100 range.  No appreciable murmur  GI: Abdomen soft, nontender palpation.  No palpable mass.  Bowel sounds normal active  Lymph/Hematologic: No lower extremity edema  Genitourinary: Not examined  Skin: Tanned skin, no petechiae.  Tattoo on left ankle/dorsal foot  Musculoskeletal: Muscular tone and bulk generally intact and appropriate for age, thin  Neurologic: Initially sleepy, though appropriate when woken.  Mental status grossly intact.  Alert, conversant with appropriate history provided.  Psychiatric: Very pleasant, normal affect    Data   Data reviewed today: I reviewed all medications, new labs and  imaging results over the last 24 hours. I personally reviewed the chest x-ray image(s) showing Right 10th rib fracture, hyperinflated lungs, no focal infiltrate; read as ninth rib fracture.    Recent Labs   Lab 09/03/21  0143   WBC 18.3*   HGB 15.4   MCV 87         POTASSIUM 4.1   CHLORIDE 101   CO2 29   BUN 20   CR 0.68   ANIONGAP 6   EARL 8.8   *   ALBUMIN 3.8   PROTTOTAL 7.8   BILITOTAL 0.7   ALKPHOS 63   ALT 33   AST 29   TROPONIN <0.015

## 2021-09-03 NOTE — PHARMACY-ADMISSION MEDICATION HISTORY
Pharmacy Medication History  Admission medication history interview status for the 9/3/2021  admission is complete. See EPIC admission navigator for prior to admission medications     Location of Interview: Phone  Medication history sources: Patient's family/friend ()    Significant changes made to the medication list:  Added inhalers    In the past week, patient estimated taking medication this percent of the time: greater than 90%      Medication reconciliation completed by provider prior to medication history? No    Time spent in this activity: 15    Prior to Admission medications    Medication Sig Last Dose Taking? Auth Provider   albuterol (PROAIR HFA/PROVENTIL HFA/VENTOLIN HFA) 108 (90 Base) MCG/ACT inhaler Inhale 1-2 puffs into the lungs every 6 hours as needed for shortness of breath / dyspnea or wheezing  Yes Unknown, Entered By History   fluticasone-vilanterol (BREO ELLIPTA) 200-25 MCG/INH inhaler Inhale 1 puff into the lungs daily 9/2/2021 at Unknown time Yes Unknown, Entered By History   indomethacin (INDOCIN) 50 MG capsule Take 50 mg by mouth 2 times daily as needed for moderate pain  Yes Unknown, Entered By History   Multiple Vitamins-Minerals (MULTIVITAMIN GUMMIES ADULT PO) Take 1 tablet by mouth daily  9/2/2021 at Unknown time Yes Reported, Patient   oxyCODONE (ROXICODONE) 5 MG tablet Take 1 tablet (5 mg) by mouth every 6 hours as needed for pain  Yes Kandice Chen PA-C       The information provided in this note is only as accurate as the sources available at the time of update(s)

## 2021-09-03 NOTE — ED NOTES
Pt and  updated on negative covid test. Pt reports her breathing does feel better at this time. Remains on bi-pap.

## 2021-09-03 NOTE — PROGRESS NOTES
Patient was admitted by my colleague, Dr. Hill overnight.  This is a 65-year-old female with history of COPD, tobacco abuse who presents with acute hypoxic respiratory failure felt secondary to COPD exacerbation likely result of viral illness.  Covid PCR negative.  Was on BiPAP at admission but this has been weaned off this morning.  She is currently on nasal cannula oxygen and maintaining good O2 saturations.  Breathing is nonlabored.  She is feeling much better.  -On IV Solu-Medrol here.  Switch to oral prednisone from tomorrow.  Continue as needed scheduled nebs.  Encouraged smoking cessation.  Wean oxygen as tolerated.

## 2021-09-03 NOTE — ED PROVIDER NOTES
History     Chief Complaint:  Shortness of Breath      HPI   Tatianna Leo is a 65 year old female who presents with shortness of breath. She reports having had shortness of breath, fever, and nausea for the past week but today her shortness of breath worsened dramatically. The patient also reports chest and lung pain but denies a history of heart problems. The patient denies use of a nebulizer at home. Per EMS she does not use an oxygen mask at home and is wheezing bilaterally, was unable to tolerate a CPAP, and does not use an oxygen mask at home. Her blood pressure was 120 systolic, 96% oxygenation with high flow.    Review of Systems   Respiratory: Positive for shortness of breath and wheezing.    Cardiovascular: Positive for chest pain.   All other systems reviewed and are negative.        Allergies:  Amoxicillin    Medications:    Norco   Aleve  Albuterol   Indocin    Past Medical History:    Actinic keratoses  Moderate vulvar dysplasia     Past Surgical History:    Cholecystectomy   Hysterectomy  tubul ligation  Appendectomy     Family History:    Brother: Cancer-colon, Heart attack, Alcoholism, Diabetes  Father: lung cancer  Mother: diabetes, heart disease  Sister: breast cancer, heart attack, Colan cancer, diabetes, heart disease    Social History:  History of tobacco use  Presents via EMS    Physical Exam     Patient Vitals for the past 24 hrs:   BP Temp Temp src Pulse Resp SpO2   09/03/21 0300 -- -- -- 98 22 100 %   09/03/21 0245 97/48 -- -- 100 25 100 %   09/03/21 0231 106/62 -- -- 103 25 99 %   09/03/21 0230 106/62 -- -- 105 25 99 %   09/03/21 0215 107/50 -- -- 103 23 100 %   09/03/21 0210 108/58 -- -- 105 26 100 %   09/03/21 0205 109/63 -- -- 110 (!) 33 100 %   09/03/21 0200 106/49 -- -- 113 29 100 %   09/03/21 0155 124/74 -- -- 117 (!) 41 98 %   09/03/21 0150 (!) 144/83 -- -- (!) 123 (!) 50 99 %   09/03/21 0140 (!) 140/82 97.3  F (36.3  C) Oral 116 (!) 34 100 %   09/03/21 0135 -- -- -- (!) 121  (!) 55 100 %       Physical Exam  Constitutional:  Cooperative. Tachypneic. Speaks in one to two word phrases. In distress.  HENT:   Head:    Atraumatic.   Mouth/Throat:   Oropharynx is without erythema or exudate and mucous membranes are tacky.   Eyes:    Conjunctivae normal and EOM are normal.      Pupils are equal, round, and reactive to light.   Neck:    Normal range of motion. Neck supple.   Cardiovascular:  Tachy rate, regular rhythm, normal heart sounds and radial and dorsalis pedis pulses are 2+ and symmetric.    Pulmonary/Chest:  Effort normal and breath sounds normal. diminished breath sounds and wheezing. Accessory muscle use.  Abdominal:   Soft. Bowel sounds are normal.      No splenomegaly or hepatomegaly. No tenderness. No rebound.   Musculoskeletal:  Normal range of motion. No edema and no tenderness.   Neurological:  Alert. Normal strength. No cranial nerve deficit.  Skin:    Skin is warm and dry.   Psychiatric:   Normal mood and affect.       Emergency Department Course   ECG:  ECG taken at 0139, ECG read at 0139  Sinus tachycardia  Right atrial enlargement  Rightward axis  Pulmonary disease pattern  Abnormal ECG   Now meets criteria for HERNANDEZ  Rate increased  Significant difference as compared to prior, dated 1/26/17.  Rate 117 bpm. AZ interval 116 ms. QRS duration 74 ms. QT/QTc 290/404 ms. P-R-T axes 89 98 77.     Imaging:  XR Chest Port 1 View  Heart size is normal. Slight calcification at the aortic arch. Lungs are well expanded and without focal infiltrate. Area of curvilinear density overlying the right lateral lung base likely reflects superimposed soft tissue attenuation. No   visible pneumothorax. Right lateral ninth rib fracture appears subacute or chronic in nature.  Reading per radiology      Laboratory:  CBC: WBC 18.3 (H), HGB 15.4,    CMP: glucose 111 (H) o/w WNL (Creatinine 0.68)   Troponin (Collected 0143): <0.015  istat gases (lactate) venous POCT: Bicarbonate 30 (H), O2 sat  venous POCT 76 (L), pCO2V venous POCT 56 (H)  Nt probnp inpatient: 101  Blood culture left hand: pending  Blood culture right hand: pending      Symptomatic Influenza A/B & SARS-CoV2 (COVID19) Virus PCR Multiplex: Negative       Emergency Department Course:  0132 Patient arrives  0133 Monitor and IV placed  0135 Patient asked for history.   0136 ECG taken and blood drawn  0139 Heart rate 115, oxygenation 100%, blood presre 151/95  0141 Chest x ray taken  0147 Heart rate 124, oxygenation 100%, blood presre 144/83  0152 Patient placed on CPAP machine   0309 Hospital chyna assigned     Reviewed:  I reviewed nursing notes, vitals, past history and care everywhere    Assessments:  0135 I obtained history and examined the patient as noted above.   0409 I rechecked the patient and explained findings.    Consults:   0250 I spoke with Dr. Hill of the Hospitalist service from [facility] regarding patient's presentation, findings, and plan of care.      Interventions:  0140 Solu medrol, 125 mg, IV  0141 Duoneb, 3 ml, nebulization  0141 Magnesium sulfate, 2 g, IV  0151 Ativan, 0.5 mg, IV   0211 Rocephin, 1 g, IV  0215 Zithromax, 500 mg, IV      Disposition:  The patient was admitted to the hospital under the care of Dr. Hill.    Impression & Plan      Medical Decision Making:  Tatianna Leo is a 65 year old female who presents for evaluation of shortness of breath and wheezing.  Signs and symptoms are consistent with COPD exacerbation.  A broad differential was considered including foreign body, reactive airway disease, pneumothorax, cardiac equivalent/ACS, viral induced wheezing, allergic phenomena, pneumonia, etc.  Patient feels improved after interventions here in ED but continues to have impairment in respiratory function including.  Given this, I will hospitalize for further intervention.  She looks and feels improved on bipap and after the above medications. She reports fevers and productive cough for the past  several days, Rocephin and azithromycin given for possible community acquired pneumonia.    There are no signs at this point of any other serious etiologies including those mentioned above especially acute coronary syndrome. I doubt this is ACS given the classic story of COPD exacerbation given by the patient, the marked wheezing without rales and a nonspecific EKG.  No signs of pneumonia.      Given change in sputum production, antibiotics are indicated and first dose given in ED.  Discussed patient with hospitalist who agreed with inpatient care.        Critical Care Time: Over 35 minutes, independent of procedures.  This included time spent obtaining a history and physical, reviewing the patient's chart, interpreting lab and imaging studies, re-examining the patient, coordinating care with other providers, and documenting ED care provided.      Covid-19  Tatianna Leo was evaluated during a global COVID-19 pandemic, which necessitated consideration that the patient might be at risk for infection with the SARS-CoV-2 virus that causes COVID-19.   Applicable protocols for evaluation were followed during the patient's care.   COVID-19 was considered as part of the patient's evaluation. The plan for testing is:  a test was obtained during this visit    Diagnosis:    ICD-10-CM    1. COPD exacerbation (H)  J44.1    2. Respiratory insufficiency  R06.89        Scribe Disclosure:  I, Chuck Newman, am serving as a scribe at 1:35 AM on 9/3/2021 to document services personally performed by No att. providers found based on my observations and the provider's statements to me.      Adria Sandoval MD  09/03/21 [id number]

## 2021-09-03 NOTE — PLAN OF CARE
Pt is A&Ox4, somnolent but arouses to voice/touch, VSS on bipap 60% FiO2. Lung sounds diminished with inspiratory and expiratory wheezes, scheduled Duoneb given by RT for wheezing. Tele SR. Up w/2 and gait belt. NPO while on bipap. CMS intact, skin WNL. No PRN pain medications given this shift. IV running D5 NS with 20 KCl, 1000 mL LR bolus given per orders. Will continue to follow plan of care.

## 2021-09-03 NOTE — PROGRESS NOTES
"SPIRITUAL HEALTH SERVICES Progress Note  [facility]    Visited pt due to request from workplace to check in with pt. I introduced myself to pt and her spouse and they invited me to stay.     Pt shared illness story with me and the feelings around it. She expressed that she was \"worried this was it\". She shared the strong support and care she has from her spouse, family, and workplace. Pt welcomed prayer and I offered prayer and words of our care and support for her and family and that SH is available to them during her stay.    I offered reflective listening and affirmation of her and spouse's feelings, experience, and meaning. SHS remains available.      Kelsey Thomas  Chaplain Resident    "

## 2021-09-03 NOTE — PLAN OF CARE
A&OX4. VSS on 3L O2 via nasal cannula. Wean off of BiPAP and transition to oxymask this am by RT. Denies any pain. Up walking in room and squires with SBA. Dyspnea on exertion. Lungs sound diminished. UA sample completed- urine very concentrated and dark zayra. Denies any pain. Continue to monitor.

## 2021-09-03 NOTE — PROVIDER NOTIFICATION
"Paged on-call hospitalist MD Hill, \"Pt SC rm [id number] had 1 dose Rocephin in ED, another 1 time dose ordered now. Do you want second dose given? Also, pt unable to tolerate bipap without Ativan, can she have anxiety PRN med? Thanks! -Charline RN *[id number]\". Orders placed by MD for PRN Ativan 0.5 mg, writer instructed to give second 1 g dose of Rocephin as ordered. Will continue to follow plan of care.  "

## 2021-09-03 NOTE — ED NOTES
Pt presents to the ED via EMS for evaluation of SOB. Hx of COPD not on home O2. Per EMS, a week of cough, SOB, fevers, and nausea. Pt was COVID swabbed this week for with a negative result. When EMS arrived on scene O2 saturations in the 80s%. Pt reports SOB increased suddenly this evening. Pt arrived to ED on 15L. Pt is vaccinated for COVID. Blood sugar: 107.

## 2021-09-03 NOTE — LETTER
[facility]  [address] [place] [place] [address]  [phone]    2021    Re: Tatianna Leo  [address] [place] [address]  [phone] (home)     : 1955      To Whom It May Concern:      Tatianna Leo was hospitalized from 9/3/2021 until 2021 due to medical illness.  Would request that she take time off from work to recuperate from her current illness.  She may return to work on 21 with full duty.        Sincerely,        Raeann Gomez MD

## 2021-09-04 LAB
ALBUMIN SERPL-MCNC: 2.6 G/DL (ref 3.4–5)
ALP SERPL-CCNC: 44 U/L (ref 40–150)
ALT SERPL W P-5'-P-CCNC: 25 U/L (ref 0–50)
ANION GAP SERPL CALCULATED.3IONS-SCNC: 3 MMOL/L (ref 3–14)
AST SERPL W P-5'-P-CCNC: 14 U/L (ref 0–45)
BACTERIA UR CULT: NORMAL
BILIRUB SERPL-MCNC: 0.5 MG/DL (ref 0.2–1.3)
BUN SERPL-MCNC: 15 MG/DL (ref 7–30)
CALCIUM SERPL-MCNC: 8.1 MG/DL (ref 8.5–10.1)
CHLORIDE BLD-SCNC: 109 MMOL/L (ref 94–109)
CO2 SERPL-SCNC: 29 MMOL/L (ref 20–32)
CREAT SERPL-MCNC: 0.55 MG/DL (ref 0.52–1.04)
ERYTHROCYTE [DISTWIDTH] IN BLOOD BY AUTOMATED COUNT: 12 % (ref 10–15)
GFR SERPL CREATININE-BSD FRML MDRD: >90 ML/MIN/1.73M2
GLUCOSE BLD-MCNC: 117 MG/DL (ref 70–99)
HCT VFR BLD AUTO: 34.8 % (ref 35–47)
HGB BLD-MCNC: 11.8 G/DL (ref 11.7–15.7)
MAGNESIUM SERPL-MCNC: 2.3 MG/DL (ref 1.6–2.3)
MCH RBC QN AUTO: 30.5 PG (ref 26.5–33)
MCHC RBC AUTO-ENTMCNC: 33.9 G/DL (ref 31.5–36.5)
MCV RBC AUTO: 90 FL (ref 78–100)
PLATELET # BLD AUTO: 199 10E3/UL (ref 150–450)
POTASSIUM BLD-SCNC: 4.3 MMOL/L (ref 3.4–5.3)
PROT SERPL-MCNC: 5.6 G/DL (ref 6.8–8.8)
RBC # BLD AUTO: 3.87 10E6/UL (ref 3.8–5.2)
SODIUM SERPL-SCNC: 141 MMOL/L (ref 133–144)
WBC # BLD AUTO: 14.1 10E3/UL (ref 4–11)

## 2021-09-04 PROCEDURE — 94640 AIRWAY INHALATION TREATMENT: CPT

## 2021-09-04 PROCEDURE — 94640 AIRWAY INHALATION TREATMENT: CPT | Mod: 76

## 2021-09-04 PROCEDURE — 250N000011 HC RX IP 250 OP 636: Performed by: INTERNAL MEDICINE

## 2021-09-04 PROCEDURE — 80053 COMPREHEN METABOLIC PANEL: CPT | Performed by: INTERNAL MEDICINE

## 2021-09-04 PROCEDURE — 120N000013 HC R&B IMCU

## 2021-09-04 PROCEDURE — 99232 SBSQ HOSP IP/OBS MODERATE 35: CPT | Performed by: HOSPITALIST

## 2021-09-04 PROCEDURE — 83735 ASSAY OF MAGNESIUM: CPT | Performed by: HOSPITALIST

## 2021-09-04 PROCEDURE — 250N000012 HC RX MED GY IP 250 OP 636 PS 637: Performed by: HOSPITALIST

## 2021-09-04 PROCEDURE — 36415 COLL VENOUS BLD VENIPUNCTURE: CPT | Performed by: INTERNAL MEDICINE

## 2021-09-04 PROCEDURE — 250N000009 HC RX 250: Performed by: INTERNAL MEDICINE

## 2021-09-04 PROCEDURE — 85027 COMPLETE CBC AUTOMATED: CPT | Performed by: INTERNAL MEDICINE

## 2021-09-04 PROCEDURE — 250N000013 HC RX MED GY IP 250 OP 250 PS 637: Performed by: INTERNAL MEDICINE

## 2021-09-04 PROCEDURE — 999N000157 HC STATISTIC RCP TIME EA 10 MIN

## 2021-09-04 RX ORDER — AZITHROMYCIN 250 MG/1
250 TABLET, FILM COATED ORAL DAILY
Status: DISCONTINUED | OUTPATIENT
Start: 2021-09-05 | End: 2021-09-05 | Stop reason: HOSPADM

## 2021-09-04 RX ADMIN — ENOXAPARIN SODIUM 40 MG: 40 INJECTION SUBCUTANEOUS at 08:09

## 2021-09-04 RX ADMIN — AZITHROMYCIN MONOHYDRATE 500 MG: 500 INJECTION, POWDER, LYOPHILIZED, FOR SOLUTION INTRAVENOUS at 04:16

## 2021-09-04 RX ADMIN — IPRATROPIUM BROMIDE AND ALBUTEROL SULFATE 3 ML: .5; 3 SOLUTION RESPIRATORY (INHALATION) at 10:52

## 2021-09-04 RX ADMIN — IPRATROPIUM BROMIDE AND ALBUTEROL SULFATE 3 ML: .5; 3 SOLUTION RESPIRATORY (INHALATION) at 07:59

## 2021-09-04 RX ADMIN — PREDNISONE 40 MG: 20 TABLET ORAL at 08:09

## 2021-09-04 RX ADMIN — NICOTINE 1 PATCH: 21 PATCH, EXTENDED RELEASE TRANSDERMAL at 08:09

## 2021-09-04 RX ADMIN — FLUTICASONE FUROATE AND VILANTEROL TRIFENATATE 1 PUFF: 200; 25 POWDER RESPIRATORY (INHALATION) at 08:16

## 2021-09-04 RX ADMIN — IPRATROPIUM BROMIDE AND ALBUTEROL SULFATE 3 ML: .5; 3 SOLUTION RESPIRATORY (INHALATION) at 15:58

## 2021-09-04 RX ADMIN — IPRATROPIUM BROMIDE AND ALBUTEROL SULFATE 3 ML: .5; 3 SOLUTION RESPIRATORY (INHALATION) at 21:04

## 2021-09-04 ASSESSMENT — ACTIVITIES OF DAILY LIVING (ADL)
ADLS_ACUITY_SCORE: 18
ADLS_ACUITY_SCORE: 16
ADLS_ACUITY_SCORE: 18
ADLS_ACUITY_SCORE: 16

## 2021-09-04 ASSESSMENT — MIFFLIN-ST. JEOR: SCORE: 1099

## 2021-09-04 NOTE — PROGRESS NOTES
[facility]    Hospitalist Progress Note    Date of Admission:  9/3/2021    Assessment & Plan     Tatianna Leo is a 65 year old female admitted on 9/3/2021. She presents the emergency department for progressive worsening of shortness of breath and fevers, found to be hypoxic and requiring BiPAP support to maintain oxygenation in the setting of underlying COPD and what seems to be a preceding viral illness.     Acute hypoxic hypercarbic respiratory failure, improving: Suspect COPD exacerbation exacerbated by acute viral illness.  Poor air movement throughout lung fields, known emphysema, improvement following nebulizer treatments.  Reports she has never had a COPD exacerbation in the past.  -Azithromycin x5 days  -Weaned off BiPAP by HD 2.  Currently on 3 L nasal cannula O2.  Wean oxygen as able.  Maintain sats greater than 89%.  -Initially on IV Solu-Medrol, now on oral prednisone 40 mg daily  -Discussed importance of smoking cessation  -Scheduled duo nebs, as needed albuterol nebulizer treatments  -Continue prior to admission Brio Ellipta 200/25 daily       Suspect acute viral illness: 1 week of symptoms starting with headache, subsequent fevers and shaking chills with fevers as high as 103, anorexia with decreased taste, nausea with nonbloody emesis, past 2 days with diarrhea.  Covid negative in the emergency department; patient works as a hospice nurse and tells me that she tested negative for Covid on Monday as well.  Was vaccinated against COVID-19.  -Blood cultures x2 pending  -Received ceftriaxone 2 g and azithromycin in the emergency department for possible pneumonia, though no pneumonia on chest x-ray; continuing azithromycin for COPD exacerbation, though holding further doses of ceftriaxone  -UA suspicious for infection, await urine cultures.  -Respiratory virus panel, nasopharyngeal swab negative     Tobacco use disorder with nicotine dependence: Smokes approximately 1  pack/day.  Prior attempts at cold turkey cessation have failed as she becomes jittery and agitated, desires a cigarette, consistent with nicotine withdrawal.  -21 mg nicotine patch panel in place.  Discussed with patient at admission  -Continue to encourage smoking cessation     History of right rib fracture: Sustained a right 10th rib fracture late May while fishing and hitting a metal bench.  Even several weeks out still with low volume on incentive spirometer by review of outside records.  Does not report any pain currently.           Diet:   Regular diet  DVT Prophylaxis: Enoxaparin (Lovenox) SQ  Batres Catheter: Not present  Central Lines: None  Code Status:   DNR/DNI. Confirmed with patient on admission,  at bedside.       Disposition: Anticipate discharge home tomorrow with/without oxygen.      Adriana Cain MD  Text Page (7am - 6pm, M-F)  [facility]  Securely message with the Vocera Web Console (learn more here)  Text page via [employer] Paging/Directory      Interval History   Stable overnight.  No fevers, chills or sweats.  Admits to dry cough.  Reading is better.   at bedside.    -Data reviewed today: I reviewed all new labs and imaging results over the last 24 hours. I personally reviewed CXR with result as noted above    Physical Exam   Temp: 97.9  F (36.6  C) Temp src: Oral BP: 94/42 Pulse: 81   Resp: 22 SpO2: 94 % O2 Device: Nasal cannula Oxygen Delivery: 3 LPM  Vitals:    09/03/21 0600 09/04/21 0500   Weight: 56.7 kg (125 lb 1.6 oz) 56.4 kg (124 lb 5.4 oz)     Vital Signs with Ranges  Temp:  [97.7  F (36.5  C)-98.5  F (36.9  C)] 97.9  F (36.6  C)  Pulse:  [71-92] 81  Resp:  [16-38] 22  BP: ()/(42-55) 94/42  SpO2:  [94 %-100 %] 94 %  I/O last 3 completed shifts:  In: 1480 [P.O.:480; I.V.:1000]  Out: 800 [Urine:800]    Constitutional: Alert, appears comfortable, in no acute distress  Respiratory: Non labored breathing, decreased air movement bilaterally, no  wheezes  Cardiovascular: Heart sounds regular rate and rhythm, no murmurs, no leg edema  GI: Abdomen is soft, non distended, non tender. Normal BS  Skin/Integumen: no rashes, no pressure sores  Neuro: alert, converses appropriately, moving all extremities, fluent speech, no facial asymmetry  Psych: mood and affect appropriate      Medications     - MEDICATION INSTRUCTIONS -         azithromycin  500 mg Intravenous Q24H     enoxaparin ANTICOAGULANT  40 mg Subcutaneous Q24H     fluticasone-vilanterol  1 puff Inhalation Daily     ipratropium - albuterol 0.5 mg/2.5 mg/3 mL  3 mL Nebulization 4x daily     nicotine  1 patch Transdermal Daily     nicotine   Transdermal Q8H     predniSONE  40 mg Oral Daily     sodium chloride (PF)  3 mL Intracatheter Q8H       Data   Recent Labs   Lab 09/04/21  0618 09/03/21  0143   WBC 14.1* 18.3*   HGB 11.8 15.4   MCV 90 87    230    136   POTASSIUM 4.3 4.1   CHLORIDE 109 101   CO2 29 29   BUN 15 20   CR 0.55 0.68   ANIONGAP 3 6   EARL 8.1* 8.8   * 111*   ALBUMIN 2.6* 3.8   PROTTOTAL 5.6* 7.8   BILITOTAL 0.5 0.7   ALKPHOS 44 63   ALT 25 33   AST 14 29   TROPONIN  --  <0.015       Imaging  No results found for this or any previous visit (from the past 24 hour(s)).

## 2021-09-04 NOTE — PROGRESS NOTES
A&OX4. VSS on 3L O2 via nasal cannula. Denies pain. Up SBA. +MACIAS. LS CTD. Continues to receive IV abx. Continuing to monitor.

## 2021-09-04 NOTE — PLAN OF CARE
A&OX4. VSS. Weaned down to RA this shift. O2 stat in high 90s. Discontinue IMC at 1200. Tele monitoring- NSR. Denies any pain. Up walking with SBA. Transition to PO abx. Showered. Adequate I/O. Continue to monitor.

## 2021-09-04 NOTE — PLAN OF CARE
VSS on RA. Tele NSR. A/Ox4. Left PIV saline locked. Pt denies pain. Up with SBA. Tolerating regular diet. BS active, passing flatus, no BM this shift. Voiding adequately.

## 2021-09-05 VITALS
HEART RATE: 74 BPM | DIASTOLIC BLOOD PRESSURE: 75 MMHG | RESPIRATION RATE: 16 BRPM | WEIGHT: 121.9 LBS | OXYGEN SATURATION: 93 % | TEMPERATURE: 98.6 F | HEIGHT: 64 IN | BODY MASS INDEX: 20.81 KG/M2 | SYSTOLIC BLOOD PRESSURE: 130 MMHG

## 2021-09-05 LAB
CREAT SERPL-MCNC: 0.62 MG/DL (ref 0.52–1.04)
GFR SERPL CREATININE-BSD FRML MDRD: >90 ML/MIN/1.73M2
MAGNESIUM SERPL-MCNC: 2.2 MG/DL (ref 1.6–2.3)
PLATELET # BLD AUTO: 257 10E3/UL (ref 150–450)
POTASSIUM BLD-SCNC: 3.3 MMOL/L (ref 3.4–5.3)

## 2021-09-05 PROCEDURE — 82565 ASSAY OF CREATININE: CPT | Performed by: INTERNAL MEDICINE

## 2021-09-05 PROCEDURE — 99239 HOSP IP/OBS DSCHRG MGMT >30: CPT | Performed by: INTERNAL MEDICINE

## 2021-09-05 PROCEDURE — 250N000012 HC RX MED GY IP 250 OP 636 PS 637: Performed by: HOSPITALIST

## 2021-09-05 PROCEDURE — 36415 COLL VENOUS BLD VENIPUNCTURE: CPT | Performed by: INTERNAL MEDICINE

## 2021-09-05 PROCEDURE — 250N000013 HC RX MED GY IP 250 OP 250 PS 637: Performed by: HOSPITALIST

## 2021-09-05 PROCEDURE — 250N000009 HC RX 250: Performed by: INTERNAL MEDICINE

## 2021-09-05 PROCEDURE — 250N000013 HC RX MED GY IP 250 OP 250 PS 637: Performed by: INTERNAL MEDICINE

## 2021-09-05 PROCEDURE — 83735 ASSAY OF MAGNESIUM: CPT | Performed by: INTERNAL MEDICINE

## 2021-09-05 PROCEDURE — 84132 ASSAY OF SERUM POTASSIUM: CPT | Performed by: INTERNAL MEDICINE

## 2021-09-05 PROCEDURE — 85049 AUTOMATED PLATELET COUNT: CPT | Performed by: INTERNAL MEDICINE

## 2021-09-05 PROCEDURE — 999N000157 HC STATISTIC RCP TIME EA 10 MIN

## 2021-09-05 PROCEDURE — 94640 AIRWAY INHALATION TREATMENT: CPT

## 2021-09-05 PROCEDURE — 250N000011 HC RX IP 250 OP 636: Performed by: INTERNAL MEDICINE

## 2021-09-05 RX ORDER — POTASSIUM CHLORIDE 1.5 G/1.58G
20 POWDER, FOR SOLUTION ORAL ONCE
Status: COMPLETED | OUTPATIENT
Start: 2021-09-05 | End: 2021-09-05

## 2021-09-05 RX ORDER — NICOTINE 21 MG/24HR
1 PATCH, TRANSDERMAL 24 HOURS TRANSDERMAL EVERY 24 HOURS
Qty: 30 PATCH | Refills: 0 | Status: SHIPPED | OUTPATIENT
Start: 2021-09-05 | End: 2022-08-22

## 2021-09-05 RX ORDER — AZITHROMYCIN 250 MG/1
250 TABLET, FILM COATED ORAL DAILY
Qty: 2 TABLET | Refills: 0 | Status: SHIPPED | OUTPATIENT
Start: 2021-09-06 | End: 2022-08-22

## 2021-09-05 RX ORDER — PREDNISONE 20 MG/1
40 TABLET ORAL DAILY
Qty: 4 TABLET | Refills: 0 | Status: SHIPPED | OUTPATIENT
Start: 2021-09-06 | End: 2022-08-22

## 2021-09-05 RX ADMIN — AZITHROMYCIN MONOHYDRATE 250 MG: 250 TABLET ORAL at 08:02

## 2021-09-05 RX ADMIN — ENOXAPARIN SODIUM 40 MG: 40 INJECTION SUBCUTANEOUS at 08:01

## 2021-09-05 RX ADMIN — POTASSIUM CHLORIDE 20 MEQ: 1.5 POWDER, FOR SOLUTION ORAL at 08:29

## 2021-09-05 RX ADMIN — NICOTINE 1 PATCH: 21 PATCH, EXTENDED RELEASE TRANSDERMAL at 08:02

## 2021-09-05 RX ADMIN — IPRATROPIUM BROMIDE AND ALBUTEROL SULFATE 3 ML: .5; 3 SOLUTION RESPIRATORY (INHALATION) at 07:31

## 2021-09-05 RX ADMIN — PREDNISONE 40 MG: 20 TABLET ORAL at 08:02

## 2021-09-05 RX ADMIN — FLUTICASONE FUROATE AND VILANTEROL TRIFENATATE 1 PUFF: 200; 25 POWDER RESPIRATORY (INHALATION) at 08:11

## 2021-09-05 ASSESSMENT — ACTIVITIES OF DAILY LIVING (ADL)
ADLS_ACUITY_SCORE: 18

## 2021-09-05 ASSESSMENT — MIFFLIN-ST. JEOR
SCORE: 1088
SCORE: 1087.93

## 2021-09-05 NOTE — PLAN OF CARE
Summary:     Pt orientation: Cognitive/Neuro/Behavioral WDL: WDL   Pain level/management:Pain/Comfort  Patient Currently in Pain: denies  Preferred Pain Scale: number (Numeric Rating Pain Scale)  Patient's Stated Pain Goal: No pain  Pain Rating Score (DVPRS): No pain  Interfered with your usual ACTIVITY?: Does not interfere 0  Interfered with your SLEEP?: Does not interfere 0  Affected your MOOD?: Does not affect 0  Contributed to your STRESS?: Does not contribute 0  Pain Management Interventions: declines;rest  Tele:SR  Drips:- MEDICATION INSTRUCTIONS -    Oxygen level/device: Oxygen Therapy  SpO2: 93 %  O2 Device: None (Room air)  Oxygen Delivery: RA  Fall risk Interventions: Fall Risk Interventions  High fall risk medications prescribed?: yes  Within arms reach in use: Yes  Transfer belt in use: No  Fall precaution band in place?: Yes    Restraints: No  Wil Score: Wil Score: 20  Tests/procedures for next shift:  Monitor pulmonary fxn  DC date: 09/06/2021  Info related to surg/dx:NA    Pt is here with progressive SOB denies pain or discomfort at this time/ VSS on RA 96% denies SOB. LS fine crackles at times encourage use of IS. Up in the room SBA voiding. Plan to go home pending nursing will continue to monitor.

## 2021-09-05 NOTE — DISCHARGE SUMMARY
[facility]  Hospitalist Discharge Summary      Date of Admission:  9/3/2021  Date of Discharge:  9/5/2021  Discharging Provider: Raeann Gomez MD      Discharge Diagnoses   Acute hypoxic and hypercapnic respiratory failure, improved  COPD acute exacerbation  Tobacco use disorder  Hypokalemia  History of right rib fracture    Follow-ups Needed After Discharge   Follow-up Appointments     Follow-up and recommended labs and tests       Follow up with primary care provider, [facility], within 7   days for hospital follow- up.           Unresulted Labs Ordered in the Past 30 Days of this Admission     Date and Time Order Name Status Description    9/3/2021  1:52 AM Blood Culture Hand, Right Preliminary     9/3/2021  1:52 AM Blood Culture Hand, Left Preliminary       These results will be followed up by PCP    Discharge Disposition   Discharged to home  Condition at discharge: Stable    Hospital Course      Tatianna Leo is a 65 year old female with history of COPD admitted on 9/3/2021 for progressive worsening of shortness of breath and fevers, found to be hypoxic and requiring BiPAP support to maintain oxygenation in the setting of underlying COPD .     Acute hypoxic hypercarbic respiratory failure, from COPD exacerbation  -Patient had 1 week of symptoms with headache, fever, shaking chills, nausea, diarrhea.  Patient has been vaccinated for Covid and was tested -2 days prior to admission.  Repeat Covid test was negative.  Respiratory viral panel, negative.  Chest x-ray unremarkable  -Initially started on azithromycin and ceftriaxone.  No evidence of bacterial infection so ceftriaxone discontinued  -Started on Solu-Medrol, changed to prednisone, weaned off BiPAP on hospital day 2.  On day of discharge on room air  -Azithromycin x5 days to complete the course  -As needed albuterol  -Continue prior to admission Brio Ellipta 200/25 daily  -Urine culture with mixed urogenital  hattie, blood culture negative to date     Hypokalemia, replaced    Tobacco use disorder with nicotine dependence: Smokes approximately 1 pack/day.  Prior attempts at cold turkey cessation have failed as she becomes jittery and agitated, desires a cigarette, consistent with nicotine withdrawal.  -21 mg nicotine patch panel helping ,patient to continue upon discharge, willing to quit     History of right rib fracture: Sustained a right 10th rib fracture late May while fishing and hitting a metal bench.  Even several weeks out still with low volume on incentive spirometer by review of outside records.  Does not report any pain currently.       Consultations This Hospital Stay   None    Code Status   No CPR- Do NOT Intubate    Time Spent on this Encounter   I, Raeann Gomez MD, personally saw the patient today and spent greater than 30 minutes discharging this patient.       Raeann Gomez MD  [place] [id number] [facility]  [address] [place] [address]  Phone: [phone]  ______________________________________________________________________    Physical Exam   Vital Signs: Temp: 98.6  F (37  C) Temp src: Oral BP: 130/75 Pulse: 74   Resp: 16 SpO2: 93 % O2 Device: None (Room air) Oxygen Delivery: 3 LPM  Weight: 121 lbs 14.4 oz  Exam:  Constitutional: Awake, alert and no distress. Appears comfortable  Head: Normocephalic. No masses, lesions, tenderness or abnormalities  ENT: ENT exam normal, no neck nodes or sinus tenderness  Cardiovascular: RRR.  No murmurs, no rubs or JVD  Respiratory: Normal WOB,b/l equal air entry, no wheezes or crackles   Gastrointestinal: Abdomen soft, non-tender. BS normal. No masses, organomegaly  : Deferred  Extremities : No edema , no clubbing or cyanosis    Neurologic: Cranial nerves II-XII grossly intact , power symmetrical, Reflexes normal and symmetric. Sensation grossly WNL.         Primary Care Physician   [facility]    Discharge Orders       Reason for your hospital stay    COPD acute exacerbation     Follow-up and recommended labs and tests     [facility] for [facility] follow- up.     Activity    Your activity upon discharge: activity as tolerated     Diet    Follow this diet upon discharge: Orders Placed This Encounter      Regular Diet Adult       Significant Results and Procedures   Results for orders placed or performed during the hospital encounter of 09/03/21   XR Chest Port 1 View    Narrative    EXAM: XR CHEST PORT 1 VIEW  LOCATION: [facility]  DATE/TIME: 9/3/2021 1:36 AM    INDICATION: fever, sob, hypoxemia  COMPARISON: 05/16/2021.      Impression    IMPRESSION: Heart size is normal. Slight calcification at the aortic arch. Lungs are well expanded and without focal infiltrate. Area of curvilinear density overlying the right lateral lung base likely reflects superimposed soft tissue attenuation. No   visible pneumothorax. Right lateral ninth rib fracture appears subacute or chronic in nature.       Discharge Medications   Current Discharge Medication List      START taking these medications    Details   azithromycin (ZITHROMAX) 250 MG tablet Take 1 tablet (250 mg) by mouth daily  Qty: 2 tablet, Refills: 0    Associated Diagnoses: COPD exacerbation (H)      nicotine (NICODERM CQ) 21 MG/24HR 24 hr patch Place 1 patch onto the skin every 24 hours  Qty: 30 patch, Refills: 0    Associated Diagnoses: COPD exacerbation (H)      predniSONE (DELTASONE) 20 MG tablet Take 2 tablets (40 mg) by mouth daily  Qty: 4 tablet, Refills: 0    Associated Diagnoses: COPD exacerbation (H)         CONTINUE these medications which have NOT CHANGED    Details   albuterol (PROAIR HFA/PROVENTIL HFA/VENTOLIN HFA) 108 (90 Base) MCG/ACT inhaler Inhale 1-2 puffs into the lungs every 6 hours as needed for shortness of breath / dyspnea or wheezing      fluticasone-vilanterol (BREO  ELLIPTA) 200-25 MCG/INH inhaler Inhale 1 puff into the lungs daily      indomethacin (INDOCIN) 50 MG capsule Take 50 mg by mouth 2 times daily as needed for moderate pain      Multiple Vitamins-Minerals (MULTIVITAMIN GUMMIES ADULT PO) Take 1 tablet by mouth daily       oxyCODONE (ROXICODONE) 5 MG tablet Take 1 tablet (5 mg) by mouth every 6 hours as needed for pain  Qty: 6 tablet, Refills: 0           Allergies   Allergies   Allergen Reactions     Amoxicillin Swelling

## 2021-09-05 NOTE — PROGRESS NOTES
AVS reviewed with pt and copy given. Signed copy placed in chart. IV removed. Return to work note given.  contacted for transport.

## 2021-09-05 NOTE — PLAN OF CARE
Pt A+Ox4. VSS on RA. Dyspnea with exertion, continuing to improve. Tele: SR. Denies Pain. Moving well IND in room with just a SBA for safety. Voiding adequately. No Nausea. Plan to discharge home today.

## 2021-09-07 ENCOUNTER — PATIENT OUTREACH (OUTPATIENT)
Dept: CARE COORDINATION | Facility: CLINIC | Age: 66
End: 2021-09-07

## 2021-09-07 DIAGNOSIS — Z71.89 OTHER SPECIFIED COUNSELING: ICD-10-CM

## 2021-09-07 NOTE — PROGRESS NOTES
"Clinic Care Coordination Contact  [facility]: Post-Discharge Note  SITUATION                                                      Admission:    Admission Date: 09/03/21   Reason for Admission: Acute hypoxic and hypercapnic respiratory failure  Discharge:   Discharge Date: 09/05/21  Discharge Diagnosis: Acute hypoxic and hypercapnic respiratory failure    BACKGROUND                                                      Tatianna Leo is a 65 year old female with history of COPD admitted on 9/3/2021 for progressive worsening of shortness of breath and fevers, found to be hypoxic and requiring BiPAP support to maintain oxygenation in the setting of underlying COPD .    ASSESSMENT      Enrollment  Primary Care Care Coordination Status: Not a Candidate    Discharge Assessment  How are you doing now that you are home?: \"Generally feeling good\"  How are your symptoms? (Red Flag symptoms escalate to triage hotline per guidelines): Improved  Do you feel your condition is stable enough to be safe at home until your provider visit?: Yes  Does the patient have their discharge instructions? : Yes  Does the patient have questions regarding their discharge instructions? : No  Were you started on any new medications or were there changes to any of your previous medications? : Yes  Does the patient have all of their medications?: Yes  Do you have questions regarding any of your medications? : No  Do you have all of your needed medical supplies or equipment (DME)?  (i.e. oxygen tank, CPAP, cane, etc.): Yes  Discharge follow-up appointment scheduled within 14 calendar days? : No  Discharge Follow Up Appointment Scheduled with?: Primary Care Provider  Is patient agreeable to assistance with scheduling? : No    Post-op ([facility] CTA Only)  If the patient had a surgery or procedure, do they have any questions for a nurse?: No    Post-op (Clinicians Only)  Did the patient have surgery or a procedure: No  Fever: No  Chills: " No  Eating & Drinking: eating and drinking without complaints/concerns  Bowel Function: normal  Date of last BM: 09/07/21  Urinary Status: voiding without complaint/concerns      PLAN                                                      Outpatient Plan:  Follow up with primary care provider, Jeremy Da Silva, within 7   days for hospital follow- up.       No future appointments.      For any urgent concerns, please contact our 24 hour nurse triage line: [phone] ([phone])         Marlys Cash  Community Health Worker  [facility]  [phone]

## 2021-09-08 LAB
BACTERIA BLD CULT: NO GROWTH
BACTERIA BLD CULT: NO GROWTH

## 2022-08-17 ENCOUNTER — HOSPITAL ENCOUNTER (EMERGENCY)
Facility: CLINIC | Age: 67
Discharge: HOME | End: 2022-08-17
Attending: PHYSICIAN ASSISTANT | Admitting: PHYSICIAN ASSISTANT
Payer: COMMERCIAL

## 2022-08-17 VITALS
WEIGHT: 120 LBS | HEART RATE: 95 BPM | HEIGHT: 65 IN | RESPIRATION RATE: 16 BRPM | SYSTOLIC BLOOD PRESSURE: 130 MMHG | DIASTOLIC BLOOD PRESSURE: 70 MMHG | TEMPERATURE: 97.6 F | BODY MASS INDEX: 19.99 KG/M2 | OXYGEN SATURATION: 96 %

## 2022-08-17 DIAGNOSIS — L72.9 INFECTED CYST OF SKIN: ICD-10-CM

## 2022-08-17 DIAGNOSIS — L08.9 INFECTED CYST OF SKIN: ICD-10-CM

## 2022-08-17 PROCEDURE — 99283 EMERGENCY DEPT VISIT LOW MDM: CPT | Mod: 25

## 2022-08-17 PROCEDURE — 10060 I&D ABSCESS SIMPLE/SINGLE: CPT

## 2022-08-17 RX ORDER — DOXYCYCLINE 100 MG/1
100 CAPSULE ORAL 2 TIMES DAILY
Qty: 14 CAPSULE | Refills: 0 | Status: SHIPPED | OUTPATIENT
Start: 2022-08-17 | End: 2022-08-24

## 2022-08-17 ASSESSMENT — ENCOUNTER SYMPTOMS
DIARRHEA: 0
VOMITING: 0
WOUND: 1
FEVER: 0
NAUSEA: 0

## 2022-08-17 NOTE — ED PROVIDER NOTES
History   Chief Complaint:  Wound Infection Between Breasts     The history is provided by the patient.      Tatianna Leo is a 66 year old female with history of COPD who presents with wound infection between breasts.    Patient was seen at Urgent Care locally July 2018 for suspected sebaceous cyst of chest wall and was drained. She was placed on doxycycline and tolerated well and Culture gram positive for cocci. No final culture sensitivity report. She has been doing well since with area remaining small and no further recurrence of infection. However did talk to her PCP about the area who referred her for imaging (however this was not obtained as glitch in ordering as order was to eval suspected skin cyst not breast mass).  Over the past week area has become more painful and red, reminding her of last time this was infected and drained in 2018.     Patient denies nausea, vomiting, diarrhea, fever, MRSA infection, and breast tenderness. She also denies having diabetes and taking mediation.     Review of Systems   Constitutional: Negative for fever.   Gastrointestinal: Negative for diarrhea, nausea and vomiting.   Musculoskeletal:        - breast tenderness   Skin: Positive for wound (Between breasts).   All other systems reviewed and are negative.    Allergies:  Amoxicillin    Medications:  Albuterol  Azithromycin  Fluticasone-vilanterol  Indomethacin  Nicotine patch  Oxycodone  Prednisone  Salmeterol  Tiotropium  Fluticasone propionate    Past Medical History:     COPD exacerbation  Respiratory insufficiency  Actinic keratoses  Moderate vulvar dysplasia  Insomnia  Tobacco use disorder    Past Surgical History:    Cholecystectomy  Hysterectomy  DEANNA and BSO  LEEP procedure   Tubal ligation  Appendectomy     Family History:    Mother- diabetes, heart disease  Father- lung cancer  Brothers - colon cancer, myocardial infarction, alcoholism, diabetes mellitus, lung cancer  Sisters - breast cancer, myocardial  "infarction, colon cancer, diabetes mellitus, heart disease    Social History:  PCP: Danny Denton   Patient presents alone  Patient works as hospice nurse  Patient entered by car  Smoker    Physical Exam     Patient Vitals for the past 24 hrs:   BP Temp Temp src Pulse Resp SpO2 Height Weight   08/17/22 1633 130/70 97.6  F (36.4  C) Temporal 95 16 96 % 1.651 m (5' 5\") 54.4 kg (120 lb)     Physical Exam  Vitals and nursing note reviewed.   Constitutional:       General: She is not in acute distress.     Appearance: Normal appearance. She is not ill-appearing, toxic-appearing or diaphoretic.   HENT:      Head: Normocephalic.      Right Ear: External ear normal.      Left Ear: External ear normal.      Mouth/Throat:      Comments: Mask in place, clear speech.   Eyes:      Conjunctiva/sclera: Conjunctivae normal.   Cardiovascular:      Rate and Rhythm: Normal rate and regular rhythm.      Pulses: Normal pulses.      Heart sounds: Normal heart sounds.   Pulmonary:      Effort: Pulmonary effort is normal. No respiratory distress.      Breath sounds: Normal breath sounds.      Comments: Left breast not engorged or with generalized edema or erythema.  No discharge from nipple or inverted nipple  Chest:       Abdominal:      Palpations: Abdomen is soft.   Musculoskeletal:         General: Normal range of motion.      Cervical back: Normal range of motion and neck supple. No rigidity.   Skin:     General: Skin is warm.      Capillary Refill: Capillary refill takes less than 2 seconds.   Neurological:      General: No focal deficit present.      Mental Status: She is alert.   Psychiatric:         Mood and Affect: Mood normal.         Behavior: Behavior normal.         Thought Content: Thought content normal.         Judgment: Judgment normal.           Emergency Department Course     Procedures       Procedure: Incision and Drainage 7651     LOCATIONS:  Left parasternal border of chest wall/medial left superior " breast.    ANESTHESIA:  Local 1% lido with epi, 3 mls  PREPARATION:  Cleansed with Betadine    PROCEDURE:  Area was aspirated with 18G needle with purulence and cystic material.  Small single straight incision with 11blade made.  Wound area probed to break up loculations and continued purulence and cyst material expelled from area.  Irrigated with saline.  Wound left open/no packing placed.  Wound dressed with bacitracin and dressing by staff prior to D/C  Patient Status: Patient tolerated the procedure well. There were no complications.    Emergency Department Course:     Reviewed:  I reviewed nursing notes, vitals, past medical history and Care Everywhere    Assessments:  1740 I obtained history and examined the patient as noted above.   1748 I performed incision and drainage procedure (as shown above). I believe that they are safe for discharge at this time.    Disposition:  The patient was discharged to home.     Impression & Plan     Medical Decision Making:  Very pleasant 67 y/o female presents for concern of infected skin cyst.  Exam as above.  This is C/W infected skin cyst that lies along left parasternal border of chest well/very medial aspect of breast.  This is not suspected to be primary breast mass or abscess but rather infected skin cyst.  Area was lanced and drained above with expulsion of cystic material and purulence. Pt tolerated well. I do not suspect sepsis or deeper space infection or that this is osteomyelitis of her sternum or ribs.  Placed on oral doxy as responded well to this in the past due to surrounding cellulitis.  Pt educated on S/S of infection and the importance of wound care.  Discussed ideal to F/U with established PCP in 3-4 days for wound check and can discuss derm referral for consideration of excision of cyst pocket but I also provided on-call gen surg contact as well.  Pt educated on S/S that should prompt ED re-eval.  Questions answered. Verbalized understanding. Comfortable  with plan and appreciative.     Diagnosis:    ICD-10-CM    1. Infected cyst of skin  L72.9     L08.9      Discharge Medications:  Discharge Medication List as of 8/17/2022  6:11 PM      START taking these medications    Details   doxycycline hyclate (VIBRAMYCIN) 100 MG capsule Take 1 capsule (100 mg) by mouth 2 times daily for 7 days, Disp-14 capsule, R-0, E-Prescribe           Scribe Disclosure:  I, Alexandra Harmon, am serving as a scribe at 5:28 PM on 8/17/2022 to document services personally performed by Emilia Wright PA-C based on my observations and the provider's statements to me.    I, Yocasta Sarabia, am serving as a scribe at 5:28 PM on 8/17/2022 to document services personally performed by Emilia Wright PA-C based on my observations and the provider's statements to me.       Emilia Wright PA-C  08/17/22 1840

## 2022-08-17 NOTE — ED NOTES
Rapid Assessment Note    History:   Tatianna Leo is a 66 year old female who presents with known cyst on the medial portion of her right breast. The patient reports that her cyst is extremely painful. States she had it drained here a year ago. Notes that it was packed and she was prescribed doxycycline.     Exam:   General:  Alert, interactive  Cardiovascular:  Well perfused  Lungs:  No respiratory distress, no accessory muscle use  Neuro:  Moving all 4 extremities  Skin:  Warm, dry  Psych:  Normal affect    Plan of Care:   I evaluated the patient and developed an initial plan of care. I discussed this plan and explained that I, or one of my partners, would be returning to complete the evaluation.     I, Sugey Carvalho, am serving as a scribe to document services personally performed by Anmol Neff MD, based on my observations and the provider's statements to me.    08/17/2022  [employer]    Portions of this medical record were completed by a scribe. UPON MY REVIEW AND AUTHENTICATION BY ELECTRONIC SIGNATURE, this confirms (a) I performed the applicable clinical services, and (b) the record is accurate.        Anmol Neff MD  08/17/22 0642

## 2022-08-17 NOTE — DISCHARGE INSTRUCTIONS
EXPLANATION:  The provider has lanced and drained a skin cyst to your chest area.      HOME CARE:  Wash the wound daily with warm water and dial soap.  Pat dry.  Apply a thin layer of topical antibiotic ointment to the wound area (bacitracin, available over the counter).  Then apply new/clean bandage  Take the oral antibiotic as prescribed until gone.   You may take Motrin and/or Tylenol as needed for discomfort.  These medications are available over the counter, use as directed on the label.    RETURN to the EMERGENCY DEPARTMENT if you develop any of the following:  Fever (temp 100.4 or higher)  Redness, swelling, or pain to the area worsens  Chest pain  Trouble breathing  Confusion  Vomiting    The examination and treatment you have received in the Emergency Department has been rendered on an emergency basis only and not intended to be a substitute for complete ongoing medical care.

## 2022-08-17 NOTE — ED TRIAGE NOTES
Abscess over cleavage of breasts infected and painful. Had it drained one year ago.      Triage Assessment     Row Name 08/17/22 2020       Triage Assessment (Adult)    Airway WDL WDL       Respiratory WDL    Respiratory WDL WDL       Skin Circulation/Temperature WDL    Skin Circulation/Temperature WDL WDL       Cardiac WDL    Cardiac WDL WDL       Peripheral/Neurovascular WDL    Peripheral Neurovascular WDL WDL       Cognitive/Neuro/Behavioral WDL    Cognitive/Neuro/Behavioral WDL WDL

## 2022-08-22 ENCOUNTER — TELEPHONE (OUTPATIENT)
Dept: SURGERY | Facility: CLINIC | Age: 67
End: 2022-08-22

## 2022-08-22 ENCOUNTER — OFFICE VISIT (OUTPATIENT)
Dept: SURGERY | Facility: CLINIC | Age: 67
End: 2022-08-22
Payer: COMMERCIAL

## 2022-08-22 VITALS
DIASTOLIC BLOOD PRESSURE: 70 MMHG | SYSTOLIC BLOOD PRESSURE: 120 MMHG | HEART RATE: 105 BPM | WEIGHT: 119 LBS | BODY MASS INDEX: 19.83 KG/M2 | HEIGHT: 65 IN

## 2022-08-22 DIAGNOSIS — L08.9 INFECTED EPITHELIAL INCLUSION CYST: Primary | ICD-10-CM

## 2022-08-22 DIAGNOSIS — L72.0 INFECTED EPITHELIAL INCLUSION CYST: Primary | ICD-10-CM

## 2022-08-22 PROCEDURE — 99203 OFFICE O/P NEW LOW 30 MIN: CPT | Performed by: SURGERY

## 2022-08-22 RX ORDER — SULFAMETHOXAZOLE/TRIMETHOPRIM 800-160 MG
1 TABLET ORAL 2 TIMES DAILY
Status: ACTIVE | OUTPATIENT
Start: 2022-08-22 | End: 2022-08-29

## 2022-08-22 NOTE — LETTER
"August 22, 2022          Danny Denton MD      RE:   Tatianna Leo 1955      Dear Colleague,    Thank you for referring your patient, Tatianna Leo, to [facility] at [facility]. Please see a copy of my visit note below.    Patient is a 66-year-old female who presents as a referral from the emergency department for evaluation of an infected chest wall cyst.  She states that she has had an abnormality in this area for many years.  This was a relatively small which she describes a blackhead that she was able to manually express material from.  Recently she has been unsuccessful in doing so.  This is also become inflamed and angry.  She was seen by both primary care as well as in the emergency department.  In the emergency department approximately 5 days ago an incision and drainage procedure was performed and she was placed on oral antibiotics.  She has been taking antibiotics.  She reports that she has had no further drainage.  The wound itself is closed up.  She has had no fevers or chills.  She reports ongoing discomfort associated with this.     PMH:   has no past medical history on file.  PSH:    has a past surgical history that includes Hysterectomy and Cholecystectomy.  Social History:   reports that she has been smoking. She has been smoking about 1.00 pack per day. She does not have any smokeless tobacco history on file. She reports that she does not drink alcohol and does not use drugs.  Family History:   family history is not on file.  Medications/Allergies: Home medications and allergies reviewed.     ROS:  The 10 point Review of Systems is negative other than noted in the HPI.     Physical Exam:  /70   Pulse 105   Ht 1.651 m (5' 5\")   Wt 54 kg (119 lb)   BMI 19.80 kg/m    GENERAL: Generally appears well.  Psych: Alert and Oriented.  Normal affect  Eyes: Sclera clear  Respiratory: Normal respiratory effort no audible wheezes  GI: Abdomen Non Distended Soft   " .  Integumentary:  No rashes, medial aspect of the left breast there is an indurated and mildly erythematous inclusion cyst.  This measures approximately 2 cm in greatest diameter.  There is no drainage.  No open wound.  Neurological: grossly intact        All new lab and imaging data was reviewed.      Impression and Plan:  Patient is a 66 year old female with infected cyst     PLAN: We discussed options for care.  I outlined the possibility of incision and drainage versus excision.  She would prefer to undergo excision.  I described the procedure to her.  I believe this could be done under local anesthetic.  I will change her antibiotic coverage.  We will schedule her for the next day or 2 to get this removed.  She was instructed that should she develop fevers or increasing redness spontaneous drainage to seek medical care.    Again, thank you for allowing me to participate in the care of your patient.      Sincerely,      Zak Quintana MD

## 2022-08-22 NOTE — TELEPHONE ENCOUNTER
Type of surgery: Excision chest wall cyst  Location of surgery: [facility]  Date and time of surgery: 8/26/22 at 12:30pm  Surgeon: Dr. Zak Quintana  Pre-Op Appt Date: patient to schedule  Post-Op Appt Date: patient to schedule   Packet sent out: Yes  Pre-cert/Authorization completed:  Not Applicable  Date: 8/22/22

## 2022-08-22 NOTE — PROGRESS NOTES
"[facility]  Surgery Consultation    PCP:  Danny Denton [phone]    HPI: Patient is a 66-year-old female who presents as a referral from the emergency department for evaluation of an infected chest wall cyst.  She states that she has had an abnormality in this area for many years.  This was a relatively small which she describes a blackhead that she was able to manually express material from.  Recently she has been unsuccessful in doing so.  This is also become inflamed and angry.  She was seen by both primary care as well as in the emergency department.  In the emergency department approximately 5 days ago an incision and drainage procedure was performed and she was placed on oral antibiotics.  She has been taking antibiotics.  She reports that she has had no further drainage.  The wound itself is closed up.  She has had no fevers or chills.  She reports ongoing discomfort associated with this.    PMH:   has no past medical history on file.  PSH:    has a past surgical history that includes Hysterectomy and Cholecystectomy.  Social History:   reports that she has been smoking. She has been smoking about 1.00 pack per day. She does not have any smokeless tobacco history on file. She reports that she does not drink alcohol and does not use drugs.  Family History:   family history is not on file.  Medications/Allergies: Home medications and allergies reviewed.    ROS:  The 10 point Review of Systems is negative other than noted in the HPI.    Physical Exam:  /70   Pulse 105   Ht 1.651 m (5' 5\")   Wt 54 kg (119 lb)   BMI 19.80 kg/m    GENERAL: Generally appears well.  Psych: Alert and Oriented.  Normal affect  Eyes: Sclera clear  Respiratory: Normal respiratory effort no audible wheezes  GI: Abdomen Non Distended Soft   .  Integumentary:  No rashes, medial aspect of the left breast there is an indurated and mildly erythematous inclusion cyst.  This measures approximately 2 cm in " greatest diameter.  There is no drainage.  No open wound.  Neurological: grossly intact      All new lab and imaging data was reviewed.     Impression and Plan:  Patient is a 66 year old female with infected cyst    PLAN: We discussed options for care.  I outlined the possibility of incision and drainage versus excision.  She would prefer to undergo excision.  I described the procedure to her.  I believe this could be done under local anesthetic.  I will change her antibiotic coverage.  We will schedule her for the next day or 2 to get this removed.  She was instructed that should she develop fevers or increasing redness spontaneous drainage to seek medical care.      Thank you very much for this consult.    Zak Quintana M.D.  [facility]  [phone]    Please route or send letter to:  Primary Care Provider (PCP) and Referring Provider

## 2022-08-23 ENCOUNTER — TELEPHONE (OUTPATIENT)
Dept: SURGERY | Facility: CLINIC | Age: 67
End: 2022-08-23

## 2022-08-23 DIAGNOSIS — L08.9 INFECTED EPITHELIAL INCLUSION CYST: Primary | ICD-10-CM

## 2022-08-23 DIAGNOSIS — L72.0 INFECTED EPITHELIAL INCLUSION CYST: Primary | ICD-10-CM

## 2022-08-23 RX ORDER — SULFAMETHOXAZOLE/TRIMETHOPRIM 800-160 MG
1 TABLET ORAL 2 TIMES DAILY
Qty: 14 TABLET | Refills: 0 | Status: SHIPPED | OUTPATIENT
Start: 2022-08-23 | End: 2022-08-30

## 2022-08-23 NOTE — TELEPHONE ENCOUNTER
Provider entered as clinic administered, which is why pharmacy did not receive    Re-entered as e-prescribe.  Will call Pharmacy to verify it was received    Patient notified via voicemail    Josy Moreno RN-BSN

## 2022-08-23 NOTE — TELEPHONE ENCOUNTER
Patient saw Dr. Quintana yesterday and he sent a new prescription:  sulfamethoxazole-trimethoprim (BACTRIM DS) 800-160 MG per tablet 1 tablet   [[id number]]      To [facility] on [address]. She went to  the prescription and they are having issues with their system and are not able to receive a prescription sent in. [facility] would like someone to call in and verbally order the medication for the patient.     Please call patient with questions:    [phone]  Ok to leave VM

## 2022-08-26 ENCOUNTER — HOSPITAL ENCOUNTER (OUTPATIENT)
Facility: CLINIC | Age: 67
Discharge: HOME | End: 2022-08-26
Attending: SURGERY | Admitting: SURGERY
Payer: COMMERCIAL

## 2022-08-26 VITALS — SYSTOLIC BLOOD PRESSURE: 151 MMHG | RESPIRATION RATE: 16 BRPM | HEART RATE: 89 BPM | DIASTOLIC BLOOD PRESSURE: 71 MMHG

## 2022-08-26 PROCEDURE — 11403 EXC TR-EXT B9+MARG 2.1-3CM: CPT | Performed by: SURGERY

## 2022-08-26 PROCEDURE — 250N000009 HC RX 250: Performed by: SURGERY

## 2022-08-26 PROCEDURE — 12032 INTMD RPR S/A/T/EXT 2.6-7.5: CPT

## 2022-08-26 RX ORDER — LIDOCAINE HYDROCHLORIDE 10 MG/ML
10 INJECTION, SOLUTION EPIDURAL; INFILTRATION; INTRACAUDAL; PERINEURAL ONCE
Status: DISCONTINUED | OUTPATIENT
Start: 2022-08-26 | End: 2022-08-26 | Stop reason: HOSPADM

## 2022-08-26 RX ORDER — LIDOCAINE HYDROCHLORIDE AND EPINEPHRINE 10; 10 MG/ML; UG/ML
INJECTION, SOLUTION INFILTRATION; PERINEURAL PRN
Status: COMPLETED | OUTPATIENT
Start: 2022-08-26 | End: 2022-08-26

## 2022-08-26 RX ADMIN — LIDOCAINE HYDROCHLORIDE AND EPINEPHRINE 10 ML: 10; 10 INJECTION, SOLUTION INFILTRATION; PERINEURAL at 12:21

## 2022-08-26 NOTE — OP NOTE
Preoperative diagnosis: Infected left chest wall cyst    Postoperative diagnosis: Same    Procedure: Excision of infected left chest wall cyst centimeters 3 cm in greatest diameter    Surgeon: aZk Quintana MD    Anesthesia: Local    Estimated blood loss: 2 cc    Specimens: Infected cyst examined and discarded with patient consent.    Indication for procedure: This is a 66-year-old female presented my office with an infected chest wall cyst.  This had been unsuccessfully incised and drained at 2 separate offices.  I discussed with her her management options.  She was started on oral antibiotics and scheduled for excision.  The potential risks of bleeding, infection, recurrence were discussed.  The patient's questions were answered she consented to proceed.    Procedure: After informed consent was obtained the patient was taken to one of the endoscopy suites at [facility].  She was placed supine on the procedure table.  The area in question was on the medial most aspect of her left breast was then prepped and draped in usual manner.  Local anesthetic was then injected to create a field block.  An elliptical incision was made around and including the overlying skin that was abnormal and changed to due to the infection.  Sharp dissection was then carried into the subcutaneous tissues and the cyst was excised to normal-appearing subcutaneous tissue.  No visible cyst was left behind.  Once the cyst was completely excised the deep subcutaneous layer was approximated with interrupted 3-0 Vicryl sutures.  Incision itself was then closed with interrupted 3-0 nylon sutures.  Bacitracin and a dry gauze dressing were applied.  The patient tolerated this without difficulty.  She left in a stable and ambulatory condition.    Zak Quintana MD

## 2022-09-06 ENCOUNTER — OFFICE VISIT (OUTPATIENT)
Dept: SURGERY | Facility: CLINIC | Age: 67
End: 2022-09-06
Payer: COMMERCIAL

## 2022-09-06 DIAGNOSIS — Z09 SURGERY FOLLOW-UP EXAMINATION: Primary | ICD-10-CM

## 2022-09-06 PROCEDURE — 99024 POSTOP FOLLOW-UP VISIT: CPT

## 2022-09-06 NOTE — PROGRESS NOTES
[facility] Office Visit Note    Subjective: Tatianna Leo is here for her first postoperative visit. She underwent an excision of infected chest wall cyst by Dr. Quintana on 8/26/22.  Today she tells me she is doing well and denies any complaints.  She is here for suture removal.  The patient denies fever/chills or wound concerns.      Objective:  Wound(s): clean, dry and intact. No erythema or drainage noted.  Sutures in place.    Pathology: none    Assessment and Plan:  S/P excision of chest wall cyst  - Sutures removed without difficulty  - Wound(s) healing well, continue to keep clean and dry.  - Follow up as needed, call if any concerns.    Tana Rod PA-C  Please route or send letter to:  Primary Care Provider (PCP)

## 2023-01-01 ENCOUNTER — HOSPITAL ENCOUNTER (EMERGENCY)
Facility: CLINIC | Age: 68
Discharge: HOME | End: 2023-06-21
Attending: EMERGENCY MEDICINE | Admitting: EMERGENCY MEDICINE
Payer: MEDICARE

## 2023-01-01 ENCOUNTER — APPOINTMENT (OUTPATIENT)
Dept: GENERAL RADIOLOGY | Facility: CLINIC | Age: 68
End: 2023-01-01
Attending: EMERGENCY MEDICINE
Payer: MEDICARE

## 2023-01-01 VITALS
WEIGHT: 118 LBS | BODY MASS INDEX: 18.96 KG/M2 | OXYGEN SATURATION: 97 % | HEIGHT: 66 IN | TEMPERATURE: 98 F | RESPIRATION RATE: 18 BRPM | DIASTOLIC BLOOD PRESSURE: 65 MMHG | HEART RATE: 82 BPM | SYSTOLIC BLOOD PRESSURE: 134 MMHG

## 2023-01-01 DIAGNOSIS — S63.501A WRIST SPRAIN, RIGHT, INITIAL ENCOUNTER: ICD-10-CM

## 2023-01-01 DIAGNOSIS — S60.211A CONTUSION OF RIGHT WRIST, INITIAL ENCOUNTER: ICD-10-CM

## 2023-01-01 PROCEDURE — 29125 APPL SHORT ARM SPLINT STATIC: CPT | Mod: RT

## 2023-01-01 PROCEDURE — 99284 EMERGENCY DEPT VISIT MOD MDM: CPT

## 2023-01-01 PROCEDURE — 73110 X-RAY EXAM OF WRIST: CPT | Mod: RT

## 2023-01-01 ASSESSMENT — ACTIVITIES OF DAILY LIVING (ADL): ADLS_ACUITY_SCORE: 35

## 2023-06-21 NOTE — ED PROVIDER NOTES
"  History     Chief Complaint:  Wrist Pain      HPI   Tatianna Leo is a 67 year old female who presents for evaluation of a wrist injury. Yesterday the patient was walking up a flight of stairs when she tripped at the top and fell catching herself with her right hand. Since then she has developed pain to her right wrist with some tingling to her fingers. Her pain is worse with movement of the wrist. Due to concern for this injury she came into the ED for evaluation. She denies any other injuries from the fall.       Independent Historian:   None - Patient Only    Review of External Notes:   none       Medications:    Albuterol inhaler     Past Medical History:    COPD   Insomnia     Past Surgical History:    Cholecystectomy  Hysterectomy    LEEP procedure  Appendectomy     Physical Exam     Patient Vitals for the past 24 hrs:   BP Temp Temp src Pulse Resp SpO2 Height Weight   06/21/23 0821 134/65 98  F (36.7  C) Temporal 82 18 97 % 1.676 m (5' 6\") 53.5 kg (118 lb)        Physical Exam  Head:  The scalp, face, and head appear normal  Eyes:  Conjunctivae are normal  ENT:    The nose is normal    Pinnae are normal  Neck:  Trachea midline  CV:  Normal rate, regular rhythm. Normal radial pulse.   Resp:  No respiratory distress   Musc:  Normal muscular tone. Contusion overlying distal radius right wrist. No snuff box tenderness. Pain with wrist ROM. Normal finger ROM.   Skin:  No rash or lesions noted  Neuro: Speech is normal and fluent. Face is symmetric. Moving all extremities well.         Emergency Department Course     Imaging:  XR Wrist Right G/E 3 Views   Final Result   IMPRESSION: No acute fracture or dislocation. 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 There is a small chronic ossicle   projecting along the radial aspect of the trapezium.      ADIEL PETERSEN MD            SYSTEM ID:  [id number]      Report per radiology    Emergency Department Course & Assessments:   "   Assessments:  0838: The patient was seen and evaluated.     0937: I updated and reassessed the patient.      Independent Interpretation (X-rays, CTs, rhythm strip):  I independently reviewed wrist x-ray. No fracture visualized.     Consultations/Discussion of Management or Tests:  None        Social Determinants of Health affecting care:   None    Disposition:  The patient was discharged to home.     Impression & Plan      Medical Decision Making:  Pt presents with right wrist pain. Ddx fracture, sprain, contusion. X-ray negative for fracture/dislocation. Subjective tingling most likely due to swelling. Doubt nerve injury. No weakness/deficits on exam. Will place pt in velcro wrist splint for comfort. F/u TCO if not improving. She is in stable condition at the time of discharge, indications for return to the ED were discussed as well as follow up. All questions were answered and she is in agreement with the plan.       Diagnosis:    ICD-10-CM    1. Contusion of right wrist, initial encounter  S60.211A       2. Wrist sprain, right, initial encounter  S63.501A              Scribe Disclosure:  I, Rajan Dill, am serving as a scribe at 8:38 AM on 6/21/2023 to document services personally performed by Don Kwok MD based on my observations and the provider's statements to me.      Don Kwok MD  06/23/23 0225

## 2023-06-21 NOTE — ED TRIAGE NOTES
Pt states she fell down the stairs 2 days ago while carrying kenia jars. Pt c/o of numbness on second digit

## 2023-06-21 NOTE — LETTER
June 21, 2023      To Whom It May Concern:      Tatianna Leo was seen in our Emergency Department today, 06/21/23.  I expect her condition to improve over the next 7-14 days.  She may return to work/school when improved.    Sincerely,      Dr. Kwok

## 2024-01-01 ENCOUNTER — APPOINTMENT (OUTPATIENT)
Dept: GENERAL RADIOLOGY | Facility: CLINIC | Age: 69
DRG: 202 | End: 2024-01-01
Attending: EMERGENCY MEDICINE
Payer: MEDICARE

## 2024-01-01 ENCOUNTER — HOSPITAL ENCOUNTER (INPATIENT)
Facility: CLINIC | Age: 69
LOS: 2 days | Discharge: HOSPICE | DRG: 202 | End: 2024-04-30
Attending: EMERGENCY MEDICINE | Admitting: STUDENT IN AN ORGANIZED HEALTH CARE EDUCATION/TRAINING PROGRAM
Payer: MEDICARE

## 2024-01-01 ENCOUNTER — APPOINTMENT (OUTPATIENT)
Dept: CARDIOLOGY | Facility: CLINIC | Age: 69
DRG: 202 | End: 2024-01-01
Attending: STUDENT IN AN ORGANIZED HEALTH CARE EDUCATION/TRAINING PROGRAM
Payer: MEDICARE

## 2024-01-01 ENCOUNTER — HOSPITAL ENCOUNTER (INPATIENT)
Facility: CLINIC | Age: 69
LOS: 2 days | Discharge: HOME | End: 2024-05-02
Attending: INTERNAL MEDICINE | Admitting: INTERNAL MEDICINE
Payer: MEDICARE

## 2024-01-01 ENCOUNTER — DOCUMENTATION ONLY (OUTPATIENT)
Dept: CARE COORDINATION | Facility: CLINIC | Age: 69
End: 2024-01-01
Payer: MEDICARE

## 2024-01-01 VITALS
HEART RATE: 105 BPM | BODY MASS INDEX: 21.81 KG/M2 | OXYGEN SATURATION: 100 % | RESPIRATION RATE: 30 BRPM | SYSTOLIC BLOOD PRESSURE: 156 MMHG | DIASTOLIC BLOOD PRESSURE: 71 MMHG | TEMPERATURE: 98.8 F | WEIGHT: 135.14 LBS

## 2024-01-01 VITALS — OXYGEN SATURATION: 95 % | TEMPERATURE: 98.4 F | HEART RATE: 95 BPM | RESPIRATION RATE: 26 BRPM

## 2024-01-01 DIAGNOSIS — R06.02 SHORTNESS OF BREATH: ICD-10-CM

## 2024-01-01 DIAGNOSIS — B33.8 RSV INFECTION: ICD-10-CM

## 2024-01-01 DIAGNOSIS — I24.89 DEMAND ISCHEMIA (H): ICD-10-CM

## 2024-01-01 DIAGNOSIS — J44.1 COPD EXACERBATION (H): ICD-10-CM

## 2024-01-01 DIAGNOSIS — J96.01 ACUTE RESPIRATORY FAILURE WITH HYPOXIA (H): ICD-10-CM

## 2024-01-01 DIAGNOSIS — Z51.5 HOSPICE CARE: ICD-10-CM

## 2024-01-01 DIAGNOSIS — I24.89 DEMAND ISCHEMIA (H): Primary | ICD-10-CM

## 2024-01-01 LAB
ALBUMIN SERPL BCG-MCNC: 3.9 G/DL (ref 3.5–5.2)
ALBUMIN SERPL BCG-MCNC: 4.5 G/DL (ref 3.5–5.2)
ALBUMIN UR-MCNC: 10 MG/DL
ALP SERPL-CCNC: 101 U/L (ref 40–150)
ALP SERPL-CCNC: 85 U/L (ref 40–150)
ALT SERPL W P-5'-P-CCNC: 12 U/L (ref 0–50)
ALT SERPL W P-5'-P-CCNC: 15 U/L (ref 0–50)
ANION GAP SERPL CALCULATED.3IONS-SCNC: 11 MMOL/L (ref 7–15)
ANION GAP SERPL CALCULATED.3IONS-SCNC: 12 MMOL/L (ref 7–15)
ANION GAP SERPL CALCULATED.3IONS-SCNC: 9 MMOL/L (ref 7–15)
APPEARANCE UR: CLEAR
AST SERPL W P-5'-P-CCNC: 19 U/L (ref 0–45)
AST SERPL W P-5'-P-CCNC: 22 U/L (ref 0–45)
ATRIAL RATE - MUSE: 112 BPM
ATRIAL RATE - MUSE: 117 BPM
ATRIAL RATE - MUSE: 121 BPM
ATRIAL RATE - MUSE: 91 BPM
BACTERIA BLD CULT: NO GROWTH
BACTERIA BLD CULT: NO GROWTH
BASE EXCESS BLDV CALC-SCNC: -3.3 MMOL/L (ref -3–3)
BASE EXCESS BLDV CALC-SCNC: 0 MMOL/L (ref -3–3)
BASE EXCESS BLDV CALC-SCNC: 0.3 MMOL/L (ref -3–3)
BASE EXCESS BLDV CALC-SCNC: 1.1 MMOL/L (ref -3–3)
BASOPHILS # BLD AUTO: 0 10E3/UL (ref 0–0.2)
BASOPHILS # BLD AUTO: 0 10E3/UL (ref 0–0.2)
BASOPHILS NFR BLD AUTO: 0 %
BASOPHILS NFR BLD AUTO: 0 %
BILIRUB SERPL-MCNC: 0.3 MG/DL
BILIRUB SERPL-MCNC: 0.6 MG/DL
BILIRUB UR QL STRIP: NEGATIVE
BUN SERPL-MCNC: 13.3 MG/DL (ref 8–23)
BUN SERPL-MCNC: 7.1 MG/DL (ref 8–23)
BUN SERPL-MCNC: 9.8 MG/DL (ref 8–23)
CALCIUM SERPL-MCNC: 7.8 MG/DL (ref 8.8–10.2)
CALCIUM SERPL-MCNC: 8.1 MG/DL (ref 8.8–10.2)
CALCIUM SERPL-MCNC: 9.1 MG/DL (ref 8.8–10.2)
CHLORIDE SERPL-SCNC: 100 MMOL/L (ref 98–107)
CHLORIDE SERPL-SCNC: 105 MMOL/L (ref 98–107)
CHLORIDE SERPL-SCNC: 107 MMOL/L (ref 98–107)
CHOLEST SERPL-MCNC: 148 MG/DL
COLOR UR AUTO: YELLOW
CREAT SERPL-MCNC: 0.73 MG/DL (ref 0.51–0.95)
CREAT SERPL-MCNC: 0.75 MG/DL (ref 0.51–0.95)
CREAT SERPL-MCNC: 0.75 MG/DL (ref 0.51–0.95)
D DIMER PPP FEU-MCNC: 0.43 UG/ML FEU (ref 0–0.5)
DEPRECATED HCO3 PLAS-SCNC: 22 MMOL/L (ref 22–29)
DEPRECATED HCO3 PLAS-SCNC: 25 MMOL/L (ref 22–29)
DEPRECATED HCO3 PLAS-SCNC: 25 MMOL/L (ref 22–29)
DIASTOLIC BLOOD PRESSURE - MUSE: NORMAL MMHG
EGFRCR SERPLBLD CKD-EPI 2021: 87 ML/MIN/1.73M2
EGFRCR SERPLBLD CKD-EPI 2021: 87 ML/MIN/1.73M2
EGFRCR SERPLBLD CKD-EPI 2021: 90 ML/MIN/1.73M2
EOSINOPHIL # BLD AUTO: 0 10E3/UL (ref 0–0.7)
EOSINOPHIL # BLD AUTO: 0 10E3/UL (ref 0–0.7)
EOSINOPHIL NFR BLD AUTO: 0 %
EOSINOPHIL NFR BLD AUTO: 0 %
ERYTHROCYTE [DISTWIDTH] IN BLOOD BY AUTOMATED COUNT: 11.9 % (ref 10–15)
ERYTHROCYTE [DISTWIDTH] IN BLOOD BY AUTOMATED COUNT: 12 % (ref 10–15)
ERYTHROCYTE [DISTWIDTH] IN BLOOD BY AUTOMATED COUNT: 12.2 % (ref 10–15)
FLUAV RNA SPEC QL NAA+PROBE: NEGATIVE
FLUBV RNA RESP QL NAA+PROBE: NEGATIVE
GLUCOSE BLDC GLUCOMTR-MCNC: 150 MG/DL (ref 70–99)
GLUCOSE SERPL-MCNC: 104 MG/DL (ref 70–99)
GLUCOSE SERPL-MCNC: 116 MG/DL (ref 70–99)
GLUCOSE SERPL-MCNC: 137 MG/DL (ref 70–99)
GLUCOSE UR STRIP-MCNC: NEGATIVE MG/DL
HBA1C MFR BLD: 5.3 %
HCO3 BLDV-SCNC: 24 MMOL/L (ref 21–28)
HCO3 BLDV-SCNC: 26 MMOL/L (ref 21–28)
HCO3 BLDV-SCNC: 26 MMOL/L (ref 21–28)
HCO3 BLDV-SCNC: 29 MMOL/L (ref 21–28)
HCT VFR BLD AUTO: 38.8 % (ref 35–47)
HCT VFR BLD AUTO: 39.3 % (ref 35–47)
HCT VFR BLD AUTO: 45.5 % (ref 35–47)
HDLC SERPL-MCNC: 48 MG/DL
HGB BLD-MCNC: 12.6 G/DL (ref 11.7–15.7)
HGB BLD-MCNC: 13.1 G/DL (ref 11.7–15.7)
HGB BLD-MCNC: 15.6 G/DL (ref 11.7–15.7)
HGB UR QL STRIP: NEGATIVE
HOLD SPECIMEN: NORMAL
IMM GRANULOCYTES # BLD: 0 10E3/UL
IMM GRANULOCYTES # BLD: 0 10E3/UL
IMM GRANULOCYTES NFR BLD: 0 %
IMM GRANULOCYTES NFR BLD: 0 %
INTERPRETATION ECG - MUSE: NORMAL
KETONES UR STRIP-MCNC: 10 MG/DL
LACTATE BLD-SCNC: 1 MMOL/L
LDLC SERPL CALC-MCNC: 93 MG/DL
LEUKOCYTE ESTERASE UR QL STRIP: ABNORMAL
LVEF ECHO: NORMAL
LYMPHOCYTES # BLD AUTO: 2.1 10E3/UL (ref 0.8–5.3)
LYMPHOCYTES # BLD AUTO: 2.9 10E3/UL (ref 0.8–5.3)
LYMPHOCYTES NFR BLD AUTO: 20 %
LYMPHOCYTES NFR BLD AUTO: 27 %
MCH RBC QN AUTO: 30.1 PG (ref 26.5–33)
MCH RBC QN AUTO: 30.4 PG (ref 26.5–33)
MCH RBC QN AUTO: 30.8 PG (ref 26.5–33)
MCHC RBC AUTO-ENTMCNC: 32.5 G/DL (ref 31.5–36.5)
MCHC RBC AUTO-ENTMCNC: 33.3 G/DL (ref 31.5–36.5)
MCHC RBC AUTO-ENTMCNC: 34.3 G/DL (ref 31.5–36.5)
MCV RBC AUTO: 90 FL (ref 78–100)
MCV RBC AUTO: 90 FL (ref 78–100)
MCV RBC AUTO: 94 FL (ref 78–100)
MONOCYTES # BLD AUTO: 0.8 10E3/UL (ref 0–1.3)
MONOCYTES # BLD AUTO: 1 10E3/UL (ref 0–1.3)
MONOCYTES NFR BLD AUTO: 8 %
MONOCYTES NFR BLD AUTO: 9 %
MUCOUS THREADS #/AREA URNS LPF: PRESENT /LPF
NEUTROPHILS # BLD AUTO: 6.9 10E3/UL (ref 1.6–8.3)
NEUTROPHILS # BLD AUTO: 7.2 10E3/UL (ref 1.6–8.3)
NEUTROPHILS NFR BLD AUTO: 64 %
NEUTROPHILS NFR BLD AUTO: 72 %
NITRATE UR QL: NEGATIVE
NONHDLC SERPL-MCNC: 100 MG/DL
NRBC # BLD AUTO: 0 10E3/UL
NRBC # BLD AUTO: 0 10E3/UL
NRBC BLD AUTO-RTO: 0 /100
NRBC BLD AUTO-RTO: 0 /100
NT-PROBNP SERPL-MCNC: 1652 PG/ML (ref 0–900)
O2/TOTAL GAS SETTING VFR VENT: 2 %
O2/TOTAL GAS SETTING VFR VENT: 45 %
O2/TOTAL GAS SETTING VFR VENT: 8 %
OXYHGB MFR BLDV: 71 % (ref 70–75)
OXYHGB MFR BLDV: 79 % (ref 70–75)
OXYHGB MFR BLDV: 93 % (ref 70–75)
P AXIS - MUSE: 81 DEGREES
P AXIS - MUSE: 84 DEGREES
P AXIS - MUSE: 86 DEGREES
P AXIS - MUSE: 90 DEGREES
PCO2 BLDV: 48 MM HG (ref 40–50)
PCO2 BLDV: 48 MM HG (ref 40–50)
PCO2 BLDV: 53 MM HG (ref 40–50)
PCO2 BLDV: 60 MM HG (ref 40–50)
PH BLDV: 7.27 [PH] (ref 7.32–7.43)
PH BLDV: 7.29 [PH] (ref 7.32–7.43)
PH BLDV: 7.34 [PH] (ref 7.32–7.43)
PH BLDV: 7.35 [PH] (ref 7.32–7.43)
PH UR STRIP: 5.5 [PH] (ref 5–7)
PLATELET # BLD AUTO: 174 10E3/UL (ref 150–450)
PLATELET # BLD AUTO: 199 10E3/UL (ref 150–450)
PLATELET # BLD AUTO: 238 10E3/UL (ref 150–450)
PO2 BLDV: 36 MM HG (ref 25–47)
PO2 BLDV: 42 MM HG (ref 25–47)
PO2 BLDV: 47 MM HG (ref 25–47)
PO2 BLDV: 72 MM HG (ref 25–47)
POTASSIUM SERPL-SCNC: 4 MMOL/L (ref 3.4–5.3)
POTASSIUM SERPL-SCNC: 4.2 MMOL/L (ref 3.4–5.3)
POTASSIUM SERPL-SCNC: 4.3 MMOL/L (ref 3.4–5.3)
PR INTERVAL - MUSE: 128 MS
PR INTERVAL - MUSE: 128 MS
PR INTERVAL - MUSE: 132 MS
PR INTERVAL - MUSE: 138 MS
PROCALCITONIN SERPL IA-MCNC: 0.09 NG/ML
PROCALCITONIN SERPL IA-MCNC: 0.19 NG/ML
PROT SERPL-MCNC: 6.3 G/DL (ref 6.4–8.3)
PROT SERPL-MCNC: 7.3 G/DL (ref 6.4–8.3)
QRS DURATION - MUSE: 72 MS
QRS DURATION - MUSE: 76 MS
QRS DURATION - MUSE: 78 MS
QRS DURATION - MUSE: 82 MS
QT - MUSE: 298 MS
QT - MUSE: 304 MS
QT - MUSE: 320 MS
QT - MUSE: 378 MS
QTC - MUSE: 414 MS
QTC - MUSE: 423 MS
QTC - MUSE: 446 MS
QTC - MUSE: 464 MS
R AXIS - MUSE: 91 DEGREES
R AXIS - MUSE: 96 DEGREES
R AXIS - MUSE: 97 DEGREES
R AXIS - MUSE: 97 DEGREES
RBC # BLD AUTO: 4.15 10E6/UL (ref 3.8–5.2)
RBC # BLD AUTO: 4.35 10E6/UL (ref 3.8–5.2)
RBC # BLD AUTO: 5.07 10E6/UL (ref 3.8–5.2)
RBC URINE: 4 /HPF
RSV RNA SPEC NAA+PROBE: POSITIVE
SAO2 % BLDV: 72.3 % (ref 70–75)
SAO2 % BLDV: 80 % (ref 70–75)
SAO2 % BLDV: 80.3 % (ref 70–75)
SAO2 % BLDV: 94.8 % (ref 70–75)
SARS-COV-2 RNA RESP QL NAA+PROBE: NEGATIVE
SODIUM SERPL-SCNC: 137 MMOL/L (ref 135–145)
SODIUM SERPL-SCNC: 139 MMOL/L (ref 135–145)
SODIUM SERPL-SCNC: 140 MMOL/L (ref 135–145)
SP GR UR STRIP: 1.02 (ref 1–1.03)
SQUAMOUS EPITHELIAL: <1 /HPF
SYSTOLIC BLOOD PRESSURE - MUSE: NORMAL MMHG
T AXIS - MUSE: 58 DEGREES
T AXIS - MUSE: 67 DEGREES
T AXIS - MUSE: 71 DEGREES
T AXIS - MUSE: 80 DEGREES
TRIGL SERPL-MCNC: 37 MG/DL
TROPONIN T SERPL HS-MCNC: 23 NG/L
TROPONIN T SERPL HS-MCNC: 24 NG/L
TROPONIN T SERPL HS-MCNC: 26 NG/L
UFH PPP CHRO-ACNC: 0.11 IU/ML
UFH PPP CHRO-ACNC: 0.24 IU/ML
UFH PPP CHRO-ACNC: 0.26 IU/ML
UFH PPP CHRO-ACNC: 0.59 IU/ML
UROBILINOGEN UR STRIP-MCNC: NORMAL MG/DL
VENTRICULAR RATE- MUSE: 112 BPM
VENTRICULAR RATE- MUSE: 117 BPM
VENTRICULAR RATE- MUSE: 121 BPM
VENTRICULAR RATE- MUSE: 91 BPM
WBC # BLD AUTO: 10.1 10E3/UL (ref 4–11)
WBC # BLD AUTO: 10.9 10E3/UL (ref 4–11)
WBC # BLD AUTO: 8 10E3/UL (ref 4–11)
WBC URINE: 128 /HPF

## 2024-01-01 PROCEDURE — 99239 HOSP IP/OBS DSCHRG MGMT >30: CPT | Mod: GV | Performed by: INTERNAL MEDICINE

## 2024-01-01 PROCEDURE — 250N000013 HC RX MED GY IP 250 OP 250 PS 637

## 2024-01-01 PROCEDURE — 94660 CPAP INITIATION&MGMT: CPT

## 2024-01-01 PROCEDURE — 36415 COLL VENOUS BLD VENIPUNCTURE: CPT | Performed by: HOSPITALIST

## 2024-01-01 PROCEDURE — 250N000013 HC RX MED GY IP 250 OP 250 PS 637: Performed by: INTERNAL MEDICINE

## 2024-01-01 PROCEDURE — 258N000003 HC RX IP 258 OP 636: Performed by: STUDENT IN AN ORGANIZED HEALTH CARE EDUCATION/TRAINING PROGRAM

## 2024-01-01 PROCEDURE — 85025 COMPLETE CBC W/AUTO DIFF WBC: CPT | Performed by: EMERGENCY MEDICINE

## 2024-01-01 PROCEDURE — 250N000009 HC RX 250: Performed by: STUDENT IN AN ORGANIZED HEALTH CARE EDUCATION/TRAINING PROGRAM

## 2024-01-01 PROCEDURE — 85014 HEMATOCRIT: CPT | Performed by: STUDENT IN AN ORGANIZED HEALTH CARE EDUCATION/TRAINING PROGRAM

## 2024-01-01 PROCEDURE — 82805 BLOOD GASES W/O2 SATURATION: CPT | Performed by: INTERNAL MEDICINE

## 2024-01-01 PROCEDURE — 93010 ELECTROCARDIOGRAM REPORT: CPT | Performed by: INTERNAL MEDICINE

## 2024-01-01 PROCEDURE — 93005 ELECTROCARDIOGRAM TRACING: CPT

## 2024-01-01 PROCEDURE — 93306 TTE W/DOPPLER COMPLETE: CPT | Mod: 26 | Performed by: INTERNAL MEDICINE

## 2024-01-01 PROCEDURE — 82805 BLOOD GASES W/O2 SATURATION: CPT | Performed by: STUDENT IN AN ORGANIZED HEALTH CARE EDUCATION/TRAINING PROGRAM

## 2024-01-01 PROCEDURE — 99223 1ST HOSP IP/OBS HIGH 75: CPT | Mod: AI | Performed by: STUDENT IN AN ORGANIZED HEALTH CARE EDUCATION/TRAINING PROGRAM

## 2024-01-01 PROCEDURE — 85520 HEPARIN ASSAY: CPT | Performed by: STUDENT IN AN ORGANIZED HEALTH CARE EDUCATION/TRAINING PROGRAM

## 2024-01-01 PROCEDURE — 250N000011 HC RX IP 250 OP 636: Performed by: STUDENT IN AN ORGANIZED HEALTH CARE EDUCATION/TRAINING PROGRAM

## 2024-01-01 PROCEDURE — 250N000013 HC RX MED GY IP 250 OP 250 PS 637: Performed by: EMERGENCY MEDICINE

## 2024-01-01 PROCEDURE — 94640 AIRWAY INHALATION TREATMENT: CPT

## 2024-01-01 PROCEDURE — 999N000157 HC STATISTIC RCP TIME EA 10 MIN

## 2024-01-01 PROCEDURE — 96375 TX/PRO/DX INJ NEW DRUG ADDON: CPT

## 2024-01-01 PROCEDURE — 82803 BLOOD GASES ANY COMBINATION: CPT

## 2024-01-01 PROCEDURE — 85379 FIBRIN DEGRADATION QUANT: CPT | Performed by: EMERGENCY MEDICINE

## 2024-01-01 PROCEDURE — 80048 BASIC METABOLIC PNL TOTAL CA: CPT | Performed by: HOSPITALIST

## 2024-01-01 PROCEDURE — 87637 SARSCOV2&INF A&B&RSV AMP PRB: CPT | Performed by: EMERGENCY MEDICINE

## 2024-01-01 PROCEDURE — 250N000013 HC RX MED GY IP 250 OP 250 PS 637: Performed by: STUDENT IN AN ORGANIZED HEALTH CARE EDUCATION/TRAINING PROGRAM

## 2024-01-01 PROCEDURE — 82805 BLOOD GASES W/O2 SATURATION: CPT | Performed by: HOSPITALIST

## 2024-01-01 PROCEDURE — 84145 PROCALCITONIN (PCT): CPT | Performed by: HOSPITALIST

## 2024-01-01 PROCEDURE — 110N000005 HC R&B HOSPICE, ACCENT

## 2024-01-01 PROCEDURE — 258N000003 HC RX IP 258 OP 636: Performed by: EMERGENCY MEDICINE

## 2024-01-01 PROCEDURE — 120N000013 HC R&B IMCU

## 2024-01-01 PROCEDURE — 96365 THER/PROPH/DIAG IV INF INIT: CPT

## 2024-01-01 PROCEDURE — 99207 PR APP CREDIT; MD BILLING SHARED VISIT: CPT | Performed by: INTERNAL MEDICINE

## 2024-01-01 PROCEDURE — 99231 SBSQ HOSP IP/OBS SF/LOW 25: CPT | Mod: GV | Performed by: INTERNAL MEDICINE

## 2024-01-01 PROCEDURE — 84484 ASSAY OF TROPONIN QUANT: CPT | Performed by: EMERGENCY MEDICINE

## 2024-01-01 PROCEDURE — 120N000001 HC R&B MED SURG/OB

## 2024-01-01 PROCEDURE — 36415 COLL VENOUS BLD VENIPUNCTURE: CPT | Performed by: STUDENT IN AN ORGANIZED HEALTH CARE EDUCATION/TRAINING PROGRAM

## 2024-01-01 PROCEDURE — 96367 TX/PROPH/DG ADDL SEQ IV INF: CPT

## 2024-01-01 PROCEDURE — 87040 BLOOD CULTURE FOR BACTERIA: CPT | Performed by: EMERGENCY MEDICINE

## 2024-01-01 PROCEDURE — 83036 HEMOGLOBIN GLYCOSYLATED A1C: CPT | Performed by: STUDENT IN AN ORGANIZED HEALTH CARE EDUCATION/TRAINING PROGRAM

## 2024-01-01 PROCEDURE — 81001 URINALYSIS AUTO W/SCOPE: CPT | Performed by: EMERGENCY MEDICINE

## 2024-01-01 PROCEDURE — 80061 LIPID PANEL: CPT | Performed by: STUDENT IN AN ORGANIZED HEALTH CARE EDUCATION/TRAINING PROGRAM

## 2024-01-01 PROCEDURE — 99233 SBSQ HOSP IP/OBS HIGH 50: CPT | Performed by: INTERNAL MEDICINE

## 2024-01-01 PROCEDURE — C8929 TTE W OR WO FOL WCON,DOPPLER: HCPCS

## 2024-01-01 PROCEDURE — 255N000002 HC RX 255 OP 636: Performed by: STUDENT IN AN ORGANIZED HEALTH CARE EDUCATION/TRAINING PROGRAM

## 2024-01-01 PROCEDURE — 84484 ASSAY OF TROPONIN QUANT: CPT | Performed by: STUDENT IN AN ORGANIZED HEALTH CARE EDUCATION/TRAINING PROGRAM

## 2024-01-01 PROCEDURE — 99418 PROLNG IP/OBS E/M EA 15 MIN: CPT | Performed by: INTERNAL MEDICINE

## 2024-01-01 PROCEDURE — 85025 COMPLETE CBC W/AUTO DIFF WBC: CPT | Performed by: HOSPITALIST

## 2024-01-01 PROCEDURE — 250N000013 HC RX MED GY IP 250 OP 250 PS 637: Performed by: HOSPITALIST

## 2024-01-01 PROCEDURE — 99223 1ST HOSP IP/OBS HIGH 75: CPT | Mod: 25 | Performed by: INTERNAL MEDICINE

## 2024-01-01 PROCEDURE — 80053 COMPREHEN METABOLIC PANEL: CPT | Performed by: EMERGENCY MEDICINE

## 2024-01-01 PROCEDURE — 250N000011 HC RX IP 250 OP 636: Performed by: INTERNAL MEDICINE

## 2024-01-01 PROCEDURE — 5A09357 ASSISTANCE WITH RESPIRATORY VENTILATION, LESS THAN 24 CONSECUTIVE HOURS, CONTINUOUS POSITIVE AIRWAY PRESSURE: ICD-10-PCS | Performed by: INTERNAL MEDICINE

## 2024-01-01 PROCEDURE — 83880 ASSAY OF NATRIURETIC PEPTIDE: CPT | Performed by: EMERGENCY MEDICINE

## 2024-01-01 PROCEDURE — 71045 X-RAY EXAM CHEST 1 VIEW: CPT

## 2024-01-01 PROCEDURE — 85520 HEPARIN ASSAY: CPT | Performed by: HOSPITALIST

## 2024-01-01 PROCEDURE — 250N000012 HC RX MED GY IP 250 OP 636 PS 637: Performed by: INTERNAL MEDICINE

## 2024-01-01 PROCEDURE — 84145 PROCALCITONIN (PCT): CPT | Performed by: EMERGENCY MEDICINE

## 2024-01-01 PROCEDURE — 250N000009 HC RX 250: Performed by: EMERGENCY MEDICINE

## 2024-01-01 PROCEDURE — 84155 ASSAY OF PROTEIN SERUM: CPT | Performed by: STUDENT IN AN ORGANIZED HEALTH CARE EDUCATION/TRAINING PROGRAM

## 2024-01-01 PROCEDURE — 36415 COLL VENOUS BLD VENIPUNCTURE: CPT | Performed by: INTERNAL MEDICINE

## 2024-01-01 PROCEDURE — 99207 PR NO BILLABLE SERVICE THIS VISIT: CPT | Performed by: HOSPITALIST

## 2024-01-01 PROCEDURE — 99291 CRITICAL CARE FIRST HOUR: CPT

## 2024-01-01 PROCEDURE — 250N000011 HC RX IP 250 OP 636: Performed by: EMERGENCY MEDICINE

## 2024-01-01 PROCEDURE — 36415 COLL VENOUS BLD VENIPUNCTURE: CPT | Performed by: EMERGENCY MEDICINE

## 2024-01-01 PROCEDURE — 84460 ALANINE AMINO (ALT) (SGPT): CPT | Performed by: STUDENT IN AN ORGANIZED HEALTH CARE EDUCATION/TRAINING PROGRAM

## 2024-01-01 RX ORDER — GLYCOPYRROLATE 1 MG/1
2 TABLET ORAL EVERY 4 HOURS PRN
Status: DISCONTINUED | OUTPATIENT
Start: 2024-01-01 | End: 2024-01-01 | Stop reason: HOSPADM

## 2024-01-01 RX ORDER — LORAZEPAM 0.5 MG/1
1 TABLET ORAL
Status: CANCELLED | OUTPATIENT
Start: 2024-01-01

## 2024-01-01 RX ORDER — NALOXONE HYDROCHLORIDE 0.4 MG/ML
0.2 INJECTION, SOLUTION INTRAMUSCULAR; INTRAVENOUS; SUBCUTANEOUS
Status: DISCONTINUED | OUTPATIENT
Start: 2024-01-01 | End: 2024-01-01 | Stop reason: ALTCHOICE

## 2024-01-01 RX ORDER — ONDANSETRON 4 MG/1
4 TABLET, ORALLY DISINTEGRATING ORAL EVERY 6 HOURS PRN
Status: CANCELLED | OUTPATIENT
Start: 2024-01-01

## 2024-01-01 RX ORDER — ACETAMINOPHEN 325 MG/1
650 TABLET ORAL EVERY 4 HOURS PRN
Status: CANCELLED | OUTPATIENT
Start: 2024-01-01

## 2024-01-01 RX ORDER — ONDANSETRON 2 MG/ML
4 INJECTION INTRAMUSCULAR; INTRAVENOUS EVERY 6 HOURS PRN
Status: CANCELLED | OUTPATIENT
Start: 2024-01-01

## 2024-01-01 RX ORDER — ATROPINE SULFATE 10 MG/ML
2 SOLUTION/ DROPS OPHTHALMIC EVERY 4 HOURS PRN
Status: CANCELLED | OUTPATIENT
Start: 2024-01-01

## 2024-01-01 RX ORDER — MORPHINE SULFATE 20 MG/ML
5 SOLUTION ORAL
Status: CANCELLED | OUTPATIENT
Start: 2024-01-01

## 2024-01-01 RX ORDER — NITROGLYCERIN 0.4 MG/1
0.4 TABLET SUBLINGUAL EVERY 5 MIN PRN
Status: DISCONTINUED | OUTPATIENT
Start: 2024-01-01 | End: 2024-01-01 | Stop reason: HOSPADM

## 2024-01-01 RX ORDER — BISACODYL 10 MG
10 SUPPOSITORY, RECTAL RECTAL
Status: DISCONTINUED | OUTPATIENT
Start: 2024-01-01 | End: 2024-01-01

## 2024-01-01 RX ORDER — IPRATROPIUM BROMIDE AND ALBUTEROL SULFATE 2.5; .5 MG/3ML; MG/3ML
3 SOLUTION RESPIRATORY (INHALATION) EVERY 4 HOURS PRN
Status: DISCONTINUED | OUTPATIENT
Start: 2024-01-01 | End: 2024-01-01 | Stop reason: HOSPADM

## 2024-01-01 RX ORDER — LORAZEPAM 2 MG/ML
1 INJECTION INTRAMUSCULAR
Status: CANCELLED | OUTPATIENT
Start: 2024-01-01

## 2024-01-01 RX ORDER — ASPIRIN 81 MG/1
81 TABLET ORAL DAILY
Status: DISCONTINUED | OUTPATIENT
Start: 2024-01-01 | End: 2024-01-01 | Stop reason: HOSPADM

## 2024-01-01 RX ORDER — IPRATROPIUM BROMIDE AND ALBUTEROL SULFATE 2.5; .5 MG/3ML; MG/3ML
3 SOLUTION RESPIRATORY (INHALATION) EVERY 4 HOURS PRN
Status: CANCELLED | OUTPATIENT
Start: 2024-01-01

## 2024-01-01 RX ORDER — NICOTINE 21 MG/24HR
1 PATCH, TRANSDERMAL 24 HOURS TRANSDERMAL DAILY
Status: DISCONTINUED | OUTPATIENT
Start: 2024-01-01 | End: 2024-01-01 | Stop reason: HOSPADM

## 2024-01-01 RX ORDER — PROCHLORPERAZINE MALEATE 5 MG
5 TABLET ORAL EVERY 6 HOURS PRN
Status: CANCELLED | OUTPATIENT
Start: 2024-01-01

## 2024-01-01 RX ORDER — MORPHINE SULFATE 2 MG/ML
2 INJECTION, SOLUTION INTRAMUSCULAR; INTRAVENOUS
Status: DISCONTINUED | OUTPATIENT
Start: 2024-01-01 | End: 2024-01-01 | Stop reason: HOSPADM

## 2024-01-01 RX ORDER — POLYETHYLENE GLYCOL 3350 17 G
2 POWDER IN PACKET (EA) ORAL
Status: CANCELLED | OUTPATIENT
Start: 2024-01-01

## 2024-01-01 RX ORDER — GLYCOPYRROLATE 1 MG/1
2 TABLET ORAL EVERY 4 HOURS PRN
Status: CANCELLED | OUTPATIENT
Start: 2024-01-01

## 2024-01-01 RX ORDER — MINERAL OIL/HYDROPHIL PETROLAT
OINTMENT (GRAM) TOPICAL
Status: CANCELLED | OUTPATIENT
Start: 2024-01-01

## 2024-01-01 RX ORDER — ACETAMINOPHEN 650 MG/1
650 SUPPOSITORY RECTAL ONCE
Status: COMPLETED | OUTPATIENT
Start: 2024-01-01 | End: 2024-01-01

## 2024-01-01 RX ORDER — IPRATROPIUM BROMIDE AND ALBUTEROL SULFATE 2.5; .5 MG/3ML; MG/3ML
SOLUTION RESPIRATORY (INHALATION)
Status: COMPLETED
Start: 2024-01-01 | End: 2024-01-01

## 2024-01-01 RX ORDER — GUAIFENESIN/DEXTROMETHORPHAN 100-10MG/5
10 SYRUP ORAL EVERY 4 HOURS PRN
Qty: 236 ML | Refills: 0 | Status: SHIPPED | OUTPATIENT
Start: 2024-01-01

## 2024-01-01 RX ORDER — CALCIUM CARBONATE 500 MG/1
1000 TABLET, CHEWABLE ORAL 4 TIMES DAILY PRN
Status: DISCONTINUED | OUTPATIENT
Start: 2024-01-01 | End: 2024-01-01 | Stop reason: HOSPADM

## 2024-01-01 RX ORDER — SODIUM CHLORIDE 9 MG/ML
INJECTION, SOLUTION INTRAVENOUS CONTINUOUS
Status: DISCONTINUED | OUTPATIENT
Start: 2024-01-01 | End: 2024-01-01

## 2024-01-01 RX ORDER — NICOTINE 21 MG/24HR
1 PATCH, TRANSDERMAL 24 HOURS TRANSDERMAL DAILY
Status: CANCELLED | OUTPATIENT
Start: 2024-01-01 | End: 2024-06-08

## 2024-01-01 RX ORDER — MORPHINE SULFATE 20 MG/ML
10 SOLUTION ORAL
Status: DISCONTINUED | OUTPATIENT
Start: 2024-01-01 | End: 2024-01-01

## 2024-01-01 RX ORDER — PREDNISONE 20 MG/1
40 TABLET ORAL DAILY
Status: CANCELLED | OUTPATIENT
Start: 2024-01-01

## 2024-01-01 RX ORDER — SENNOSIDES 8.6 MG
1 TABLET ORAL 2 TIMES DAILY PRN
Status: DISCONTINUED | OUTPATIENT
Start: 2024-01-01 | End: 2024-01-01

## 2024-01-01 RX ORDER — PREDNISONE 20 MG/1
40 TABLET ORAL DAILY
Status: DISCONTINUED | OUTPATIENT
Start: 2024-01-01 | End: 2024-01-01 | Stop reason: HOSPADM

## 2024-01-01 RX ORDER — BISACODYL 10 MG
10 SUPPOSITORY, RECTAL RECTAL
Status: DISCONTINUED | OUTPATIENT
Start: 2024-01-01 | End: 2024-01-01 | Stop reason: HOSPADM

## 2024-01-01 RX ORDER — LORAZEPAM 2 MG/ML
1 INJECTION INTRAMUSCULAR
Status: DISCONTINUED | OUTPATIENT
Start: 2024-01-01 | End: 2024-01-01 | Stop reason: HOSPADM

## 2024-01-01 RX ORDER — PROCHLORPERAZINE 25 MG
12.5 SUPPOSITORY, RECTAL RECTAL EVERY 12 HOURS PRN
Status: DISCONTINUED | OUTPATIENT
Start: 2024-01-01 | End: 2024-01-01 | Stop reason: HOSPADM

## 2024-01-01 RX ORDER — ACETAMINOPHEN 325 MG/1
650 TABLET ORAL EVERY 4 HOURS PRN
Status: DISCONTINUED | OUTPATIENT
Start: 2024-01-01 | End: 2024-01-01 | Stop reason: HOSPADM

## 2024-01-01 RX ORDER — NALOXONE HYDROCHLORIDE 0.4 MG/ML
0.4 INJECTION, SOLUTION INTRAMUSCULAR; INTRAVENOUS; SUBCUTANEOUS
Status: DISCONTINUED | OUTPATIENT
Start: 2024-01-01 | End: 2024-01-01 | Stop reason: ALTCHOICE

## 2024-01-01 RX ORDER — AZITHROMYCIN 500 MG/1
500 INJECTION, POWDER, LYOPHILIZED, FOR SOLUTION INTRAVENOUS EVERY 24 HOURS
Status: DISCONTINUED | OUTPATIENT
Start: 2024-01-01 | End: 2024-01-01

## 2024-01-01 RX ORDER — MORPHINE SULFATE 10 MG/5ML
10 SOLUTION ORAL
Status: DISCONTINUED | OUTPATIENT
Start: 2024-01-01 | End: 2024-01-01 | Stop reason: HOSPADM

## 2024-01-01 RX ORDER — MORPHINE SULFATE 20 MG/ML
10 SOLUTION ORAL EVERY 4 HOURS
Status: DISCONTINUED | OUTPATIENT
Start: 2024-01-01 | End: 2024-01-01 | Stop reason: HOSPADM

## 2024-01-01 RX ORDER — ASPIRIN 81 MG/1
324 TABLET, CHEWABLE ORAL ONCE
Status: COMPLETED | OUTPATIENT
Start: 2024-01-01 | End: 2024-01-01

## 2024-01-01 RX ORDER — LORAZEPAM 0.5 MG/1
1 TABLET ORAL
Status: DISCONTINUED | OUTPATIENT
Start: 2024-01-01 | End: 2024-01-01

## 2024-01-01 RX ORDER — LORAZEPAM 0.5 MG/1
1 TABLET ORAL
Status: DISCONTINUED | OUTPATIENT
Start: 2024-01-01 | End: 2024-01-01 | Stop reason: HOSPADM

## 2024-01-01 RX ORDER — FLUTICASONE FUROATE AND VILANTEROL 100; 25 UG/1; UG/1
1 POWDER RESPIRATORY (INHALATION) DAILY
Status: DISCONTINUED | OUTPATIENT
Start: 2024-01-01 | End: 2024-01-01 | Stop reason: HOSPADM

## 2024-01-01 RX ORDER — NICOTINE 21 MG/24HR
1 PATCH, TRANSDERMAL 24 HOURS TRANSDERMAL DAILY
Status: CANCELLED | OUTPATIENT
Start: 2024-06-08 | End: 2024-07-06

## 2024-01-01 RX ORDER — NICOTINE 21 MG/24HR
1 PATCH, TRANSDERMAL 24 HOURS TRANSDERMAL DAILY
Qty: 28 PATCH | Refills: 0 | Status: DISCONTINUED | OUTPATIENT
Start: 2024-06-08 | End: 2024-01-01 | Stop reason: HOSPADM

## 2024-01-01 RX ORDER — AMOXICILLIN 250 MG
2 CAPSULE ORAL 2 TIMES DAILY PRN
Status: DISCONTINUED | OUTPATIENT
Start: 2024-01-01 | End: 2024-01-01 | Stop reason: HOSPADM

## 2024-01-01 RX ORDER — LORAZEPAM 0.5 MG/1
0.5 TABLET ORAL 2 TIMES DAILY PRN
Status: DISCONTINUED | OUTPATIENT
Start: 2024-01-01 | End: 2024-01-01

## 2024-01-01 RX ORDER — BISACODYL 10 MG
10 SUPPOSITORY, RECTAL RECTAL
Status: CANCELLED | OUTPATIENT
Start: 2024-01-01

## 2024-01-01 RX ORDER — ACETAMINOPHEN 650 MG/1
650 SUPPOSITORY RECTAL EVERY 4 HOURS PRN
Status: DISCONTINUED | OUTPATIENT
Start: 2024-01-01 | End: 2024-01-01 | Stop reason: HOSPADM

## 2024-01-01 RX ORDER — NALOXONE HYDROCHLORIDE 0.4 MG/ML
0.2 INJECTION, SOLUTION INTRAMUSCULAR; INTRAVENOUS; SUBCUTANEOUS
Status: CANCELLED | OUTPATIENT
Start: 2024-01-01

## 2024-01-01 RX ORDER — LIDOCAINE 40 MG/G
CREAM TOPICAL
Status: DISCONTINUED | OUTPATIENT
Start: 2024-01-01 | End: 2024-01-01 | Stop reason: HOSPADM

## 2024-01-01 RX ORDER — MORPHINE SULFATE 20 MG/ML
5 SOLUTION ORAL
Status: DISCONTINUED | OUTPATIENT
Start: 2024-01-01 | End: 2024-01-01

## 2024-01-01 RX ORDER — POLYETHYLENE GLYCOL 3350 17 G
2 POWDER IN PACKET (EA) ORAL
Status: DISCONTINUED | OUTPATIENT
Start: 2024-01-01 | End: 2024-01-01 | Stop reason: HOSPADM

## 2024-01-01 RX ORDER — MINERAL OIL/HYDROPHIL PETROLAT
OINTMENT (GRAM) TOPICAL
Status: DISCONTINUED | OUTPATIENT
Start: 2024-01-01 | End: 2024-01-01

## 2024-01-01 RX ORDER — ATROPINE SULFATE 10 MG/ML
2 SOLUTION/ DROPS OPHTHALMIC EVERY 4 HOURS PRN
Status: DISCONTINUED | OUTPATIENT
Start: 2024-01-01 | End: 2024-01-01 | Stop reason: HOSPADM

## 2024-01-01 RX ORDER — AMOXICILLIN 250 MG
1 CAPSULE ORAL 2 TIMES DAILY PRN
Status: CANCELLED | OUTPATIENT
Start: 2024-01-01

## 2024-01-01 RX ORDER — MORPHINE SULFATE 100 MG/5ML
10 SOLUTION ORAL EVERY 4 HOURS
Qty: 15 ML | Refills: 0 | Status: SHIPPED | OUTPATIENT
Start: 2024-01-01

## 2024-01-01 RX ORDER — PREDNISONE 20 MG/1
40 TABLET ORAL DAILY
Qty: 4 TABLET | Refills: 0 | Status: SHIPPED | OUTPATIENT
Start: 2024-01-01 | End: 2024-01-01

## 2024-01-01 RX ORDER — LORAZEPAM 2 MG/ML
1 INJECTION INTRAMUSCULAR
Status: DISCONTINUED | OUTPATIENT
Start: 2024-01-01 | End: 2024-01-01

## 2024-01-01 RX ORDER — AMOXICILLIN 250 MG
2 CAPSULE ORAL 2 TIMES DAILY PRN
Status: CANCELLED | OUTPATIENT
Start: 2024-01-01

## 2024-01-01 RX ORDER — NALOXONE HYDROCHLORIDE 0.4 MG/ML
0.1 INJECTION, SOLUTION INTRAMUSCULAR; INTRAVENOUS; SUBCUTANEOUS
Status: CANCELLED | OUTPATIENT
Start: 2024-01-01

## 2024-01-01 RX ORDER — NALOXONE HYDROCHLORIDE 0.4 MG/ML
0.2 INJECTION, SOLUTION INTRAMUSCULAR; INTRAVENOUS; SUBCUTANEOUS
Status: DISCONTINUED | OUTPATIENT
Start: 2024-01-01 | End: 2024-01-01 | Stop reason: HOSPADM

## 2024-01-01 RX ORDER — PROCHLORPERAZINE MALEATE 5 MG
5 TABLET ORAL EVERY 6 HOURS PRN
Status: DISCONTINUED | OUTPATIENT
Start: 2024-01-01 | End: 2024-01-01 | Stop reason: HOSPADM

## 2024-01-01 RX ORDER — CARBOXYMETHYLCELLULOSE SODIUM 5 MG/ML
1-2 SOLUTION/ DROPS OPHTHALMIC
Status: DISCONTINUED | OUTPATIENT
Start: 2024-01-01 | End: 2024-01-01 | Stop reason: HOSPADM

## 2024-01-01 RX ORDER — ALBUTEROL SULFATE 90 UG/1
1-2 AEROSOL, METERED RESPIRATORY (INHALATION) EVERY 6 HOURS PRN
Status: DISCONTINUED | OUTPATIENT
Start: 2024-01-01 | End: 2024-01-01 | Stop reason: HOSPADM

## 2024-01-01 RX ORDER — LORAZEPAM 1 MG/1
1 TABLET ORAL EVERY 6 HOURS PRN
Qty: 10 TABLET | Refills: 0 | Status: SHIPPED | OUTPATIENT
Start: 2024-01-01

## 2024-01-01 RX ORDER — IPRATROPIUM BROMIDE AND ALBUTEROL SULFATE 2.5; .5 MG/3ML; MG/3ML
3 SOLUTION RESPIRATORY (INHALATION) ONCE
Status: COMPLETED | OUTPATIENT
Start: 2024-01-01 | End: 2024-01-01

## 2024-01-01 RX ORDER — MORPHINE SULFATE 2 MG/ML
2 INJECTION, SOLUTION INTRAMUSCULAR; INTRAVENOUS
Status: CANCELLED | OUTPATIENT
Start: 2024-01-01

## 2024-01-01 RX ORDER — ONDANSETRON 2 MG/ML
4 INJECTION INTRAMUSCULAR; INTRAVENOUS ONCE
Status: COMPLETED | OUTPATIENT
Start: 2024-01-01 | End: 2024-01-01

## 2024-01-01 RX ORDER — ONDANSETRON 2 MG/ML
4 INJECTION INTRAMUSCULAR; INTRAVENOUS EVERY 6 HOURS PRN
Status: DISCONTINUED | OUTPATIENT
Start: 2024-01-01 | End: 2024-01-01

## 2024-01-01 RX ORDER — ONDANSETRON 4 MG/1
4 TABLET, ORALLY DISINTEGRATING ORAL EVERY 6 HOURS PRN
Status: DISCONTINUED | OUTPATIENT
Start: 2024-01-01 | End: 2024-01-01 | Stop reason: HOSPADM

## 2024-01-01 RX ORDER — HYDRALAZINE HYDROCHLORIDE 10 MG/1
10 TABLET, FILM COATED ORAL EVERY 4 HOURS PRN
Status: DISCONTINUED | OUTPATIENT
Start: 2024-01-01 | End: 2024-01-01

## 2024-01-01 RX ORDER — SENNA AND DOCUSATE SODIUM 50; 8.6 MG/1; MG/1
2 TABLET, FILM COATED ORAL AT BEDTIME
Qty: 60 TABLET | Refills: 0 | Status: SHIPPED | OUTPATIENT
Start: 2024-01-01

## 2024-01-01 RX ORDER — CARBOXYMETHYLCELLULOSE SODIUM 5 MG/ML
1-2 SOLUTION/ DROPS OPHTHALMIC
Status: CANCELLED | OUTPATIENT
Start: 2024-01-01

## 2024-01-01 RX ORDER — LORAZEPAM 2 MG/ML
0.5 INJECTION INTRAMUSCULAR ONCE
Status: COMPLETED | OUTPATIENT
Start: 2024-01-01 | End: 2024-01-01

## 2024-01-01 RX ORDER — NICOTINE 21 MG/24HR
1 PATCH, TRANSDERMAL 24 HOURS TRANSDERMAL DAILY
Qty: 38 PATCH | Refills: 0 | Status: DISCONTINUED | OUTPATIENT
Start: 2024-01-01 | End: 2024-01-01 | Stop reason: HOSPADM

## 2024-01-01 RX ORDER — ASPIRIN 81 MG/1
81 TABLET ORAL DAILY
Status: DISCONTINUED | OUTPATIENT
Start: 2024-01-01 | End: 2024-01-01

## 2024-01-01 RX ORDER — AMOXICILLIN 250 MG
1 CAPSULE ORAL 2 TIMES DAILY PRN
Status: DISCONTINUED | OUTPATIENT
Start: 2024-01-01 | End: 2024-01-01 | Stop reason: HOSPADM

## 2024-01-01 RX ORDER — ASPIRIN 81 MG/1
81 TABLET ORAL DAILY
Status: CANCELLED | OUTPATIENT
Start: 2024-01-01

## 2024-01-01 RX ORDER — ACETAMINOPHEN 650 MG/1
650 SUPPOSITORY RECTAL EVERY 4 HOURS PRN
Status: CANCELLED | OUTPATIENT
Start: 2024-01-01

## 2024-01-01 RX ORDER — FLUTICASONE FUROATE AND VILANTEROL 100; 25 UG/1; UG/1
1 POWDER RESPIRATORY (INHALATION) DAILY
COMMUNITY

## 2024-01-01 RX ORDER — GUAIFENESIN/DEXTROMETHORPHAN 100-10MG/5
10 SYRUP ORAL EVERY 4 HOURS PRN
Status: DISCONTINUED | OUTPATIENT
Start: 2024-01-01 | End: 2024-01-01 | Stop reason: HOSPADM

## 2024-01-01 RX ORDER — LORAZEPAM 0.5 MG/1
0.5 TABLET ORAL 2 TIMES DAILY
Status: DISCONTINUED | OUTPATIENT
Start: 2024-01-01 | End: 2024-01-01

## 2024-01-01 RX ORDER — CEFTRIAXONE 1 G/1
1 INJECTION, POWDER, FOR SOLUTION INTRAMUSCULAR; INTRAVENOUS EVERY 24 HOURS
Status: DISCONTINUED | OUTPATIENT
Start: 2024-01-01 | End: 2024-01-01

## 2024-01-01 RX ORDER — AZITHROMYCIN 500 MG/1
500 INJECTION, POWDER, LYOPHILIZED, FOR SOLUTION INTRAVENOUS ONCE
Qty: 5 ML | Refills: 0 | Status: COMPLETED | OUTPATIENT
Start: 2024-01-01 | End: 2024-01-01

## 2024-01-01 RX ORDER — MORPHINE SULFATE 2 MG/ML
1 INJECTION, SOLUTION INTRAMUSCULAR; INTRAVENOUS
Status: CANCELLED | OUTPATIENT
Start: 2024-01-01

## 2024-01-01 RX ORDER — LORAZEPAM 2 MG/ML
1 INJECTION INTRAMUSCULAR ONCE
Status: COMPLETED | OUTPATIENT
Start: 2024-01-01 | End: 2024-01-01

## 2024-01-01 RX ORDER — MINERAL OIL/HYDROPHIL PETROLAT
OINTMENT (GRAM) TOPICAL
Status: DISCONTINUED | OUTPATIENT
Start: 2024-01-01 | End: 2024-01-01 | Stop reason: HOSPADM

## 2024-01-01 RX ORDER — HYDRALAZINE HYDROCHLORIDE 20 MG/ML
10 INJECTION INTRAMUSCULAR; INTRAVENOUS EVERY 4 HOURS PRN
Status: DISCONTINUED | OUTPATIENT
Start: 2024-01-01 | End: 2024-01-01

## 2024-01-01 RX ORDER — PROCHLORPERAZINE 25 MG
12.5 SUPPOSITORY, RECTAL RECTAL EVERY 12 HOURS PRN
Status: CANCELLED | OUTPATIENT
Start: 2024-01-01

## 2024-01-01 RX ORDER — NALOXONE HYDROCHLORIDE 0.4 MG/ML
0.1 INJECTION, SOLUTION INTRAMUSCULAR; INTRAVENOUS; SUBCUTANEOUS
Status: DISCONTINUED | OUTPATIENT
Start: 2024-01-01 | End: 2024-01-01 | Stop reason: HOSPADM

## 2024-01-01 RX ORDER — SENNOSIDES 8.6 MG
1 TABLET ORAL 2 TIMES DAILY PRN
Status: DISCONTINUED | OUTPATIENT
Start: 2024-01-01 | End: 2024-01-01 | Stop reason: HOSPADM

## 2024-01-01 RX ORDER — NICOTINE 21 MG/24HR
1 PATCH, TRANSDERMAL 24 HOURS TRANSDERMAL DAILY
Status: DISCONTINUED | OUTPATIENT
Start: 2024-06-09 | End: 2024-01-01 | Stop reason: HOSPADM

## 2024-01-01 RX ORDER — ASPIRIN 81 MG/1
81 TABLET ORAL DAILY
Qty: 30 TABLET | Refills: 0 | Status: SHIPPED | OUTPATIENT
Start: 2024-01-01

## 2024-01-01 RX ORDER — MORPHINE SULFATE 20 MG/ML
10 SOLUTION ORAL
Status: CANCELLED | OUTPATIENT
Start: 2024-01-01

## 2024-01-01 RX ORDER — ONDANSETRON 2 MG/ML
4 INJECTION INTRAMUSCULAR; INTRAVENOUS EVERY 6 HOURS PRN
Status: DISCONTINUED | OUTPATIENT
Start: 2024-01-01 | End: 2024-01-01 | Stop reason: HOSPADM

## 2024-01-01 RX ORDER — FLUTICASONE FUROATE AND VILANTEROL 100; 25 UG/1; UG/1
1 POWDER RESPIRATORY (INHALATION) DAILY
Status: CANCELLED | OUTPATIENT
Start: 2024-01-01

## 2024-01-01 RX ORDER — SENNOSIDES 8.6 MG
1 TABLET ORAL 2 TIMES DAILY PRN
Status: CANCELLED | OUTPATIENT
Start: 2024-01-01

## 2024-01-01 RX ORDER — LORAZEPAM 0.5 MG/1
0.5 TABLET ORAL EVERY 4 HOURS PRN
Status: DISCONTINUED | OUTPATIENT
Start: 2024-01-01 | End: 2024-01-01

## 2024-01-01 RX ORDER — GUAIFENESIN/DEXTROMETHORPHAN 100-10MG/5
10 SYRUP ORAL EVERY 4 HOURS PRN
Status: CANCELLED | OUTPATIENT
Start: 2024-01-01

## 2024-01-01 RX ORDER — MORPHINE SULFATE 2 MG/ML
1 INJECTION, SOLUTION INTRAMUSCULAR; INTRAVENOUS
Status: DISCONTINUED | OUTPATIENT
Start: 2024-01-01 | End: 2024-01-01 | Stop reason: HOSPADM

## 2024-01-01 RX ORDER — CEFTRIAXONE 2 G/1
2 INJECTION, POWDER, FOR SOLUTION INTRAMUSCULAR; INTRAVENOUS ONCE
Qty: 20 ML | Refills: 0 | Status: COMPLETED | OUTPATIENT
Start: 2024-01-01 | End: 2024-01-01

## 2024-01-01 RX ORDER — CARBOXYMETHYLCELLULOSE SODIUM 5 MG/ML
1-2 SOLUTION/ DROPS OPHTHALMIC
Status: DISCONTINUED | OUTPATIENT
Start: 2024-01-01 | End: 2024-01-01

## 2024-01-01 RX ORDER — MORPHINE SULFATE 20 MG/ML
10 SOLUTION ORAL
Status: DISCONTINUED | OUTPATIENT
Start: 2024-01-01 | End: 2024-01-01 | Stop reason: HOSPADM

## 2024-01-01 RX ORDER — ONDANSETRON 4 MG/1
4 TABLET, ORALLY DISINTEGRATING ORAL EVERY 6 HOURS PRN
Status: DISCONTINUED | OUTPATIENT
Start: 2024-01-01 | End: 2024-01-01

## 2024-01-01 RX ORDER — NITROGLYCERIN 0.4 MG/1
0.4 TABLET SUBLINGUAL EVERY 5 MIN PRN
Status: CANCELLED | OUTPATIENT
Start: 2024-01-01

## 2024-01-01 RX ORDER — MORPHINE SULFATE 100 MG/5ML
10 SOLUTION ORAL
Status: SHIPPED
Start: 2024-01-01

## 2024-01-01 RX ORDER — ACETAMINOPHEN 500 MG
1000 TABLET ORAL ONCE
Status: DISCONTINUED | OUTPATIENT
Start: 2024-01-01 | End: 2024-01-01

## 2024-01-01 RX ORDER — MORPHINE SULFATE 20 MG/ML
10 SOLUTION ORAL EVERY 4 HOURS
Status: DISCONTINUED | OUTPATIENT
Start: 2024-01-01 | End: 2024-01-01

## 2024-01-01 RX ORDER — HEPARIN SODIUM 10000 [USP'U]/100ML
0-5000 INJECTION, SOLUTION INTRAVENOUS CONTINUOUS
Status: DISCONTINUED | OUTPATIENT
Start: 2024-01-01 | End: 2024-01-01

## 2024-01-01 RX ADMIN — SODIUM CHLORIDE: 9 INJECTION, SOLUTION INTRAVENOUS at 20:36

## 2024-01-01 RX ADMIN — MORPHINE SULFATE 5 MG: 100 SOLUTION ORAL at 09:00

## 2024-01-01 RX ADMIN — MORPHINE SULFATE 10 MG: 10 SOLUTION ORAL at 12:03

## 2024-01-01 RX ADMIN — HUMAN ALBUMIN MICROSPHERES AND PERFLUTREN 9 ML: 10; .22 INJECTION, SOLUTION INTRAVENOUS at 11:32

## 2024-01-01 RX ADMIN — UMECLIDINIUM 1 PUFF: 62.5 AEROSOL, POWDER ORAL at 13:31

## 2024-01-01 RX ADMIN — ACETAMINOPHEN 650 MG: 325 TABLET, FILM COATED ORAL at 20:17

## 2024-01-01 RX ADMIN — ALBUTEROL SULFATE 2 PUFF: 108 INHALANT RESPIRATORY (INHALATION) at 15:41

## 2024-01-01 RX ADMIN — FLUTICASONE FUROATE AND VILANTEROL 1 PUFF: 100; 25 POWDER RESPIRATORY (INHALATION) at 09:02

## 2024-01-01 RX ADMIN — ASPIRIN 81 MG: 81 TABLET, COATED ORAL at 09:00

## 2024-01-01 RX ADMIN — LORAZEPAM 1 MG: 0.5 TABLET ORAL at 07:46

## 2024-01-01 RX ADMIN — HEPARIN SODIUM 750 UNITS/HR: 10000 INJECTION, SOLUTION INTRAVENOUS at 04:04

## 2024-01-01 RX ADMIN — LORAZEPAM 1 MG: 0.5 TABLET ORAL at 09:00

## 2024-01-01 RX ADMIN — MORPHINE SULFATE 10 MG: 100 SOLUTION ORAL at 02:44

## 2024-01-01 RX ADMIN — LORAZEPAM 0.5 MG: 0.5 TABLET ORAL at 11:24

## 2024-01-01 RX ADMIN — HEPARIN SODIUM 850 UNITS/HR: 10000 INJECTION, SOLUTION INTRAVENOUS at 09:37

## 2024-01-01 RX ADMIN — LORAZEPAM 1 MG: 0.5 TABLET ORAL at 19:55

## 2024-01-01 RX ADMIN — LORAZEPAM 0.5 MG: 0.5 TABLET ORAL at 03:09

## 2024-01-01 RX ADMIN — ACETAMINOPHEN 650 MG: 650 SUPPOSITORY RECTAL at 01:31

## 2024-01-01 RX ADMIN — UMECLIDINIUM 1 PUFF: 62.5 AEROSOL, POWDER ORAL at 09:34

## 2024-01-01 RX ADMIN — MORPHINE SULFATE 10 MG: 10 SOLUTION ORAL at 18:31

## 2024-01-01 RX ADMIN — ASPIRIN 81 MG: 81 TABLET, COATED ORAL at 15:42

## 2024-01-01 RX ADMIN — NICOTINE 1 PATCH: 21 PATCH, EXTENDED RELEASE TRANSDERMAL at 09:10

## 2024-01-01 RX ADMIN — ASPIRIN 81 MG: 81 TABLET, COATED ORAL at 09:35

## 2024-01-01 RX ADMIN — CEFTRIAXONE SODIUM 1 G: 1 INJECTION, POWDER, FOR SOLUTION INTRAMUSCULAR; INTRAVENOUS at 00:09

## 2024-01-01 RX ADMIN — MORPHINE SULFATE 10 MG: 10 SOLUTION ORAL at 14:04

## 2024-01-01 RX ADMIN — PREDNISONE 40 MG: 20 TABLET ORAL at 09:00

## 2024-01-01 RX ADMIN — NICOTINE 1 PATCH: 21 PATCH, EXTENDED RELEASE TRANSDERMAL at 07:47

## 2024-01-01 RX ADMIN — MORPHINE SULFATE 10 MG: 100 SOLUTION ORAL at 18:36

## 2024-01-01 RX ADMIN — LORAZEPAM 1 MG: 0.5 TABLET ORAL at 16:43

## 2024-01-01 RX ADMIN — LORAZEPAM 1 MG: 0.5 TABLET ORAL at 13:38

## 2024-01-01 RX ADMIN — GUAIFENESIN AND DEXTROMETHORPHAN 10 ML: 100; 10 SYRUP ORAL at 18:19

## 2024-01-01 RX ADMIN — NICOTINE 1 PATCH: 21 PATCH, EXTENDED RELEASE TRANSDERMAL at 08:34

## 2024-01-01 RX ADMIN — MORPHINE SULFATE 10 MG: 100 SOLUTION ORAL at 06:02

## 2024-01-01 RX ADMIN — HEPARIN SODIUM 750 UNITS/HR: 10000 INJECTION, SOLUTION INTRAVENOUS at 03:13

## 2024-01-01 RX ADMIN — SODIUM CHLORIDE 1000 ML: 9 INJECTION, SOLUTION INTRAVENOUS at 01:15

## 2024-01-01 RX ADMIN — ALBUTEROL SULFATE 2 PUFF: 108 INHALANT RESPIRATORY (INHALATION) at 22:35

## 2024-01-01 RX ADMIN — IPRATROPIUM BROMIDE AND ALBUTEROL SULFATE 3 ML: .5; 3 SOLUTION RESPIRATORY (INHALATION) at 06:51

## 2024-01-01 RX ADMIN — NITROGLYCERIN 0.4 MG: 0.4 TABLET SUBLINGUAL at 05:53

## 2024-01-01 RX ADMIN — MORPHINE SULFATE 2 MG: 2 INJECTION, SOLUTION INTRAMUSCULAR; INTRAVENOUS at 16:43

## 2024-01-01 RX ADMIN — ACETAMINOPHEN 650 MG: 650 SUPPOSITORY RECTAL at 20:41

## 2024-01-01 RX ADMIN — UMECLIDINIUM 1 PUFF: 62.5 AEROSOL, POWDER ORAL at 08:36

## 2024-01-01 RX ADMIN — LORAZEPAM 0.5 MG: 0.5 TABLET ORAL at 17:34

## 2024-01-01 RX ADMIN — LORAZEPAM 0.5 MG: 2 INJECTION INTRAMUSCULAR; INTRAVENOUS at 01:12

## 2024-01-01 RX ADMIN — AZITHROMYCIN MONOHYDRATE 500 MG: 500 INJECTION, POWDER, LYOPHILIZED, FOR SOLUTION INTRAVENOUS at 01:38

## 2024-01-01 RX ADMIN — IPRATROPIUM BROMIDE AND ALBUTEROL SULFATE 3 ML: .5; 3 SOLUTION RESPIRATORY (INHALATION) at 00:22

## 2024-01-01 RX ADMIN — FLUTICASONE FUROATE AND VILANTEROL 1 PUFF: 100; 25 POWDER RESPIRATORY (INHALATION) at 09:36

## 2024-01-01 RX ADMIN — IPRATROPIUM BROMIDE AND ALBUTEROL SULFATE 3 ML: .5; 3 SOLUTION RESPIRATORY (INHALATION) at 03:16

## 2024-01-01 RX ADMIN — MORPHINE SULFATE 10 MG: 100 SOLUTION ORAL at 22:19

## 2024-01-01 RX ADMIN — LORAZEPAM 1 MG: 0.5 TABLET ORAL at 23:27

## 2024-01-01 RX ADMIN — ONDANSETRON 4 MG: 2 INJECTION INTRAMUSCULAR; INTRAVENOUS at 01:10

## 2024-01-01 RX ADMIN — FLUTICASONE FUROATE AND VILANTEROL TRIFENATATE 1 PUFF: 100; 25 POWDER RESPIRATORY (INHALATION) at 09:35

## 2024-01-01 RX ADMIN — GUAIFENESIN AND DEXTROMETHORPHAN 10 ML: 100; 10 SYRUP ORAL at 22:38

## 2024-01-01 RX ADMIN — LORAZEPAM 1 MG: 2 INJECTION INTRAMUSCULAR; INTRAVENOUS at 03:48

## 2024-01-01 RX ADMIN — SODIUM CHLORIDE: 9 INJECTION, SOLUTION INTRAVENOUS at 03:17

## 2024-01-01 RX ADMIN — PREDNISONE 40 MG: 20 TABLET ORAL at 09:35

## 2024-01-01 RX ADMIN — NITROGLYCERIN 0.4 MG: 0.4 TABLET SUBLINGUAL at 05:58

## 2024-01-01 RX ADMIN — AZITHROMYCIN MONOHYDRATE 500 MG: 500 INJECTION, POWDER, LYOPHILIZED, FOR SOLUTION INTRAVENOUS at 02:06

## 2024-01-01 RX ADMIN — LORAZEPAM 1 MG: 0.5 TABLET ORAL at 16:25

## 2024-01-01 RX ADMIN — FLUTICASONE FUROATE AND VILANTEROL TRIFENATATE 1 PUFF: 100; 25 POWDER RESPIRATORY (INHALATION) at 12:29

## 2024-01-01 RX ADMIN — LORAZEPAM 1 MG: 0.5 TABLET ORAL at 16:07

## 2024-01-01 RX ADMIN — PREDNISONE 40 MG: 20 TABLET ORAL at 09:32

## 2024-01-01 RX ADMIN — CEFTRIAXONE SODIUM 2 G: 2 INJECTION, POWDER, FOR SOLUTION INTRAMUSCULAR; INTRAVENOUS at 01:35

## 2024-01-01 RX ADMIN — MORPHINE SULFATE 10 MG: 100 SOLUTION ORAL at 09:34

## 2024-01-01 RX ADMIN — ASPIRIN 81 MG: 81 TABLET, COATED ORAL at 09:32

## 2024-01-01 RX ADMIN — LORAZEPAM 1 MG: 0.5 TABLET ORAL at 12:13

## 2024-01-01 RX ADMIN — IPRATROPIUM BROMIDE AND ALBUTEROL SULFATE 3 ML: .5; 3 SOLUTION RESPIRATORY (INHALATION) at 01:17

## 2024-01-01 RX ADMIN — MORPHINE SULFATE 2 MG: 2 INJECTION, SOLUTION INTRAMUSCULAR; INTRAVENOUS at 13:09

## 2024-01-01 RX ADMIN — MORPHINE SULFATE 10 MG: 10 SOLUTION ORAL at 20:52

## 2024-01-01 RX ADMIN — GUAIFENESIN AND DEXTROMETHORPHAN 10 ML: 100; 10 SYRUP ORAL at 06:19

## 2024-01-01 RX ADMIN — GUAIFENESIN AND DEXTROMETHORPHAN 10 ML: 100; 10 SYRUP ORAL at 12:55

## 2024-01-01 RX ADMIN — GUAIFENESIN AND DEXTROMETHORPHAN 10 ML: 100; 10 SYRUP ORAL at 11:40

## 2024-01-01 RX ADMIN — MORPHINE SULFATE 10 MG: 10 SOLUTION ORAL at 04:40

## 2024-01-01 RX ADMIN — ASPIRIN 81 MG CHEWABLE TABLET 324 MG: 81 TABLET CHEWABLE at 02:20

## 2024-01-01 RX ADMIN — MORPHINE SULFATE 10 MG: 100 SOLUTION ORAL at 12:05

## 2024-01-01 RX ADMIN — MORPHINE SULFATE 10 MG: 100 SOLUTION ORAL at 14:16

## 2024-01-01 ASSESSMENT — ACTIVITIES OF DAILY LIVING (ADL)
ADLS_ACUITY_SCORE: 38
ADLS_ACUITY_SCORE: 27
ADLS_ACUITY_SCORE: 30
ADLS_ACUITY_SCORE: 28
ADLS_ACUITY_SCORE: 25
ADLS_ACUITY_SCORE: 26
ADLS_ACUITY_SCORE: 28
ADLS_ACUITY_SCORE: 26
ADLS_ACUITY_SCORE: 28
ADLS_ACUITY_SCORE: 25
ADLS_ACUITY_SCORE: 28
ADLS_ACUITY_SCORE: 25
ADLS_ACUITY_SCORE: 28
ADLS_ACUITY_SCORE: 25
ADLS_ACUITY_SCORE: 30
ADLS_ACUITY_SCORE: 23
ADLS_ACUITY_SCORE: 25
ADLS_ACUITY_SCORE: 27
ADLS_ACUITY_SCORE: 27
ADLS_ACUITY_SCORE: 28
ADLS_ACUITY_SCORE: 28
ADLS_ACUITY_SCORE: 30
ADLS_ACUITY_SCORE: 28
ADLS_ACUITY_SCORE: 27
ADLS_ACUITY_SCORE: 28
ADLS_ACUITY_SCORE: 27
ADLS_ACUITY_SCORE: 28
ADLS_ACUITY_SCORE: 38
ADLS_ACUITY_SCORE: 30
ADLS_ACUITY_SCORE: 28
ADLS_ACUITY_SCORE: 27
ADLS_ACUITY_SCORE: 38
ADLS_ACUITY_SCORE: 28
ADLS_ACUITY_SCORE: 28
ADLS_ACUITY_SCORE: 30
ADLS_ACUITY_SCORE: 28
ADLS_ACUITY_SCORE: 28
ADLS_ACUITY_SCORE: 25
ADLS_ACUITY_SCORE: 26
ADLS_ACUITY_SCORE: 38
ADLS_ACUITY_SCORE: 25
ADLS_ACUITY_SCORE: 25
ADLS_ACUITY_SCORE: 27
ADLS_ACUITY_SCORE: 28
ADLS_ACUITY_SCORE: 27
ADLS_ACUITY_SCORE: 30
ADLS_ACUITY_SCORE: 28
ADLS_ACUITY_SCORE: 30
ADLS_ACUITY_SCORE: 38
ADLS_ACUITY_SCORE: 25
ADLS_ACUITY_SCORE: 27
ADLS_ACUITY_SCORE: 28
ADLS_ACUITY_SCORE: 25
ADLS_ACUITY_SCORE: 25
ADLS_ACUITY_SCORE: 28
ADLS_ACUITY_SCORE: 28
ADLS_ACUITY_SCORE: 27
ADLS_ACUITY_SCORE: 28
ADLS_ACUITY_SCORE: 30
ADLS_ACUITY_SCORE: 28
ADLS_ACUITY_SCORE: 26
ADLS_ACUITY_SCORE: 27
ADLS_ACUITY_SCORE: 28
ADLS_ACUITY_SCORE: 30
ADLS_ACUITY_SCORE: 26
ADLS_ACUITY_SCORE: 28
ADLS_ACUITY_SCORE: 30
ADLS_ACUITY_SCORE: 28
ADLS_ACUITY_SCORE: 26
ADLS_ACUITY_SCORE: 30
ADLS_ACUITY_SCORE: 30
ADLS_ACUITY_SCORE: 28
ADLS_ACUITY_SCORE: 28
ADLS_ACUITY_SCORE: 26
ADLS_ACUITY_SCORE: 25
ADLS_ACUITY_SCORE: 28
ADLS_ACUITY_SCORE: 28
ADLS_ACUITY_SCORE: 30
ADLS_ACUITY_SCORE: 28
ADLS_ACUITY_SCORE: 30
ADLS_ACUITY_SCORE: 28
ADLS_ACUITY_SCORE: 25
ADLS_ACUITY_SCORE: 26
ADLS_ACUITY_SCORE: 28
ADLS_ACUITY_SCORE: 30
ADLS_ACUITY_SCORE: 27
ADLS_ACUITY_SCORE: 25
ADLS_ACUITY_SCORE: 27
ADLS_ACUITY_SCORE: 30
ADLS_ACUITY_SCORE: 28
ADLS_ACUITY_SCORE: 28

## 2024-01-01 ASSESSMENT — COLUMBIA-SUICIDE SEVERITY RATING SCALE - C-SSRS
2. HAVE YOU ACTUALLY HAD ANY THOUGHTS OF KILLING YOURSELF IN THE PAST MONTH?: NO
1. IN THE PAST MONTH, HAVE YOU WISHED YOU WERE DEAD OR WISHED YOU COULD GO TO SLEEP AND NOT WAKE UP?: NO
6. HAVE YOU EVER DONE ANYTHING, STARTED TO DO ANYTHING, OR PREPARED TO DO ANYTHING TO END YOUR LIFE?: NO

## 2024-04-28 PROBLEM — J96.01 ACUTE RESPIRATORY FAILURE WITH HYPOXIA (H): Status: ACTIVE | Noted: 2024-01-01

## 2024-04-28 PROBLEM — I24.89 DEMAND ISCHEMIA (H): Status: ACTIVE | Noted: 2024-01-01

## 2024-04-28 PROBLEM — B33.8 RSV INFECTION: Status: ACTIVE | Noted: 2024-01-01

## 2024-04-28 PROBLEM — J44.1 COPD EXACERBATION (H): Status: ACTIVE | Noted: 2021-09-03

## 2024-04-28 PROBLEM — R06.02 SHORTNESS OF BREATH: Status: ACTIVE | Noted: 2024-01-01

## 2024-04-28 NOTE — CONSULTS
[facility]    Cardiology Consultation     Date of Admission:  4/28/2024    Assessment & Plan   Tatianna Leo is a 68 year old female who was admitted on 4/28/2024.    Acute chest discomfort with palpitations/tachycardia and hypoxia, possible non-ST elevation microinfarction versus focal myocarditis  Acute on chronic hypoxic respiratory failure due to combination of RSV infection and COPD exacerbation  Abnormal echocardiogram showing focal wall motion abnormality    Discussion  Patient's episode of chest pain could have been related to hypoxia which in turn is related to hypoxic respiratory failure RSV infection.  However there is a wall motion abnormality on her echocardiogram showing mid anterior/mid anterolateral hypokinesis.  Differential at this time would be a non-ST elevation microinfarction given elevated troponin and wall motion abnormality versus focal myocarditis given recent RBC infection.    For further evaluation, I would recommend invasive coronary angiography when she is more stable in 1 to 2 days, most likely Tuesday.    Meanwhile continue respiratory support for her COPD exacerbation RSV infection.  From cardiac perspective, not a good candidate for beta-blockers due to underlying COPD.  Continue heparin.  Will add baby aspirin.     At today's visit, I reviewed the admission chest x-ray, EKGs, hospitalist notes, labs and echo images.  Today's medical decision making was of high complexity         Kodi Lubin MD  Primary Care Physician   Danny Denton    Reason for Consult   Reason for consult: I was asked by hospitalist to evaluate this patient for elevated trop.    History of Present Illness     I had the pleasure of seeing Tatianna Leo in cardiology consultation at request of hospitalist for chest pain and elevated troponin.    About 7 days ago patient had a sudden onset episode of chest pressure that she says crushing type that lasted about 7 to 8 minutes  at night.  It was associated with rapid heart rate and hypoxia more than usual.  She did not seek emergency medical attention at that time and symptoms improved.  She called her primary care provider and they recommended going to the emergency room but she did not do that.  This episode again recurred yesterday and patient this time her also shortness of breath and again hypoxia.  She also was not feeling well had some fever and chills the day before.  She had again palpitations and chest discomfort left was pressure type.  She had fever up to 104 in the emergency room and heart rates in the 140s.  Oxygen saturation was in the 70s.  In the emergency room she was evaluated started on oxygen supplementation as well as check for viruses.  She came back RSV positive.    EKG on admission was reviewed and revealed sinus tachycardia with right axis and RV enlargement.  No ischemic changes were noted.  Admission troponins were 24 and 26.  N-terminal proBNP was 1652.  Potassium and creatinine normal.  VBG revealed respiratory acidosis with pCO2 of 53 and pH of 7.27.    Patient has longstanding history of COPD.  She is a long-term smoker and still continues to smoke intermittently cigarettes.  She has smoked for 55 years.  She was previously asked to use oxygen by pulmonologist but she was not using it.    Patient not very active.  She has chronic fatigue.    Evaluation here include echocardiogram which revealed normal systolic function but with focal wall motion abnormality in the mid anterior/mid anterolateral segments.      Past Medical History   Past Medical History:   Diagnosis Date    COPD (chronic obstructive pulmonary disease) (H)          Past Surgical History   Past Surgical History:   Procedure Laterality Date    CHOLECYSTECTOMY      HYSTERECTOMY           Prior to Admission Medications   Prior to Admission Medications   Prescriptions Last Dose Informant Patient Reported? Taking?   albuterol (PROAIR HFA/PROVENTIL  HFA/VENTOLIN HFA) 108 (90 Base) MCG/ACT inhaler prn Spouse/Significant Other Yes Yes   Sig: Inhale 1-2 puffs into the lungs every 6 hours as needed for shortness of breath / dyspnea or wheezing   fluticasone-vilanterol (BREO ELLIPTA) 100-25 MCG/ACT inhaler 4/27/2024  Yes Yes   Sig: Inhale 1 puff into the lungs daily   tiotropium (SPIRIVA RESPIMAT) 2.5 MCG/ACT inhaler 4/27/2024  Yes Yes   Sig: Inhale 1 puff into the lungs daily      Facility-Administered Medications: None     Current Facility-Administered Medications   Medication Dose Route Frequency Provider Last Rate Last Admin    acetaminophen (TYLENOL) tablet 650 mg  650 mg Oral Q4H PRN Felicity Gordillo MD        Or    acetaminophen (TYLENOL) Suppository 650 mg  650 mg Rectal Q4H PRN Felicity Gordillo MD        albuterol (PROVENTIL HFA/VENTOLIN HFA) inhaler  1-2 puff Inhalation Q6H PRN Yoshi Linda MD        [START ON 4/29/2024] azithromycin (ZITHROMAX) 500 mg vial to attach to  mL bag  500 mg Intravenous Q24H Felicity Gordillo MD        calcium carbonate (TUMS) chewable tablet 1,000 mg  1,000 mg Oral 4x Daily PRN Felicity Gordillo MD        [START ON 4/29/2024] cefTRIAXone (ROCEPHIN) 1 g vial to attach to  mL bag for ADULTS or NS 50 mL bag for PEDS  1 g Intravenous Q24H Felicity Gordillo MD        fluticasone-vilanterol (BREO ELLIPTA) 100-25 MCG/ACT inhaler 1 puff  1 puff Inhalation Daily Yoshi Linda MD        guaiFENesin-dextromethorphan (ROBITUSSIN DM) 100-10 MG/5ML syrup 10 mL  10 mL Oral Q4H PRN Yoshi Linda MD   10 mL at 04/28/24 1140    heparin 25,000 units in 0.45% NaCl 250 mL ANTICOAGULANT infusion  0-5,000 Units/hr Intravenous Continuous Felicity Gordillo MD   Paused at 04/28/24 1033    hydrALAZINE (APRESOLINE) tablet 10 mg  10 mg Oral Q4H PRN Felicity Gordillo MD        Or    hydrALAZINE (APRESOLINE) injection 10 mg  10 mg Intravenous Q4H PRN Felicity Gordillo MD        ipratropium - albuterol 0.5 mg/2.5  mg/3 mL (DUONEB) neb solution 3 mL  3 mL Nebulization Q4H PRN Felicity Gordillo MD        lidocaine (LMX4) cream   Topical Q1H PRN Felicity Gordillo MD        lidocaine (LMX4) cream   Topical Q1H PRN Felicity Gordillo MD        lidocaine 1 % 0.1-1 mL  0.1-1 mL Other Q1H PRN Felicity Gordillo MD        lidocaine 1 % 0.1-1 mL  0.1-1 mL Other Q1H PRN Felicity Gordillo MD        nicotine (COMMIT) lozenge 2 mg  2 mg Buccal Q1H PRN Felicity Gordillo MD        nicotine (NICODERM CQ) 21 MG/24HR 24 hr patch 1 patch  1 patch Transdermal Daily Felicity Gordillo MD   1 patch at 04/28/24 0910    Followed by    [START ON 6/9/2024] nicotine (NICODERM CQ) 14 MG/24HR 24 hr patch 1 patch  1 patch Transdermal Daily Felicity Gordillo MD        Followed by    [START ON 7/7/2024] nicotine (NICODERM CQ) 7 MG/24HR 24 hr patch 1 patch  1 patch Transdermal Daily Felicity Gordillo MD        ondansetron (ZOFRAN ODT) ODT tab 4 mg  4 mg Oral Q6H PRN Felicity Gordillo MD        Or    ondansetron (ZOFRAN) injection 4 mg  4 mg Intravenous Q6H PRN Felicity Gordillo MD        senna-docusate (SENOKOT-S/PERICOLACE) 8.6-50 MG per tablet 1 tablet  1 tablet Oral BID PRN Felicity Gordillo MD        Or    senna-docusate (SENOKOT-S/PERICOLACE) 8.6-50 MG per tablet 2 tablet  2 tablet Oral BID PRN Felicity Gordillo MD        sodium chloride (PF) 0.9% PF flush 3 mL  3 mL Intracatheter q1 min prn Anmol Neff MD        sodium chloride (PF) 0.9% PF flush 3 mL  3 mL Intracatheter Q8H Anmol Neff MD   3 mL at 04/28/24 0407    sodium chloride (PF) 0.9% PF flush 3 mL  3 mL Intracatheter Q8H Felicity Gordillo MD        sodium chloride (PF) 0.9% PF flush 3 mL  3 mL Intracatheter q1 min prn Felicity Gordillo MD        sodium chloride (PF) 0.9% PF flush 3 mL  3 mL Intracatheter Q8H Felicity Gordillo MD        sodium chloride (PF) 0.9% PF flush 3 mL  3 mL Intracatheter q1 min prn Felicity Gordillo MD        sodium chloride 0.9 % infusion   Intravenous  Continuous Felicity Gordillo MD 50 mL/hr at 04/28/24 0317 New Bag at 04/28/24 0317    tiotropium (SPIRIVA RESPIMAT) inhalation aerosol 1 puff  1 puff Inhalation Daily Yoshi Linda MD         Current Facility-Administered Medications   Medication Dose Route Frequency Provider Last Rate Last Admin    acetaminophen (TYLENOL) tablet 650 mg  650 mg Oral Q4H PRN Felicity Gordillo MD        Or    acetaminophen (TYLENOL) Suppository 650 mg  650 mg Rectal Q4H PRN Felicity Gordillo MD        albuterol (PROVENTIL HFA/VENTOLIN HFA) inhaler  1-2 puff Inhalation Q6H PRN Yoshi Linda MD        [START ON 4/29/2024] azithromycin (ZITHROMAX) 500 mg vial to attach to  mL bag  500 mg Intravenous Q24H Felicity Gordillo MD        calcium carbonate (TUMS) chewable tablet 1,000 mg  1,000 mg Oral 4x Daily PRN Felicity Gordillo MD        [START ON 4/29/2024] cefTRIAXone (ROCEPHIN) 1 g vial to attach to  mL bag for ADULTS or NS 50 mL bag for PEDS  1 g Intravenous Q24H Felicity Gordillo MD        fluticasone-vilanterol (BREO ELLIPTA) 100-25 MCG/ACT inhaler 1 puff  1 puff Inhalation Daily Yoshi Linda MD        guaiFENesin-dextromethorphan (ROBITUSSIN DM) 100-10 MG/5ML syrup 10 mL  10 mL Oral Q4H PRN Yoshi Linda MD   10 mL at 04/28/24 1140    heparin 25,000 units in 0.45% NaCl 250 mL ANTICOAGULANT infusion  0-5,000 Units/hr Intravenous Continuous Felicity Gordillo MD   Paused at 04/28/24 1033    hydrALAZINE (APRESOLINE) tablet 10 mg  10 mg Oral Q4H PRN Felicity Gordillo MD        Or    hydrALAZINE (APRESOLINE) injection 10 mg  10 mg Intravenous Q4H PRN Felicity Gordillo MD        ipratropium - albuterol 0.5 mg/2.5 mg/3 mL (DUONEB) neb solution 3 mL  3 mL Nebulization Q4H PRN Felicity Gordillo MD        lidocaine (LMX4) cream   Topical Q1H PRN Felicity Gordillo MD        lidocaine (LMX4) cream   Topical Q1H PRN Felicity Gordillo MD        lidocaine 1 % 0.1-1 mL  0.1-1 mL Other Q1H PRN  Felicity Gordillo MD        lidocaine 1 % 0.1-1 mL  0.1-1 mL Other Q1H PRN Felicity Gordillo MD        nicotine (COMMIT) lozenge 2 mg  2 mg Buccal Q1H PRN Felicity Gordillo MD        nicotine (NICODERM CQ) 21 MG/24HR 24 hr patch 1 patch  1 patch Transdermal Daily Felicity Gordillo MD   1 patch at 04/28/24 0910    Followed by    [START ON 6/9/2024] nicotine (NICODERM CQ) 14 MG/24HR 24 hr patch 1 patch  1 patch Transdermal Daily Felicity Gordillo MD        Followed by    [START ON 7/7/2024] nicotine (NICODERM CQ) 7 MG/24HR 24 hr patch 1 patch  1 patch Transdermal Daily Felicity Gordillo MD        ondansetron (ZOFRAN ODT) ODT tab 4 mg  4 mg Oral Q6H PRN Felicity Gordillo MD        Or    ondansetron (ZOFRAN) injection 4 mg  4 mg Intravenous Q6H PRN Felicity Gordillo MD        senna-docusate (SENOKOT-S/PERICOLACE) 8.6-50 MG per tablet 1 tablet  1 tablet Oral BID PRN Felicity Gordillo MD        Or    senna-docusate (SENOKOT-S/PERICOLACE) 8.6-50 MG per tablet 2 tablet  2 tablet Oral BID PRN Felicity Gordillo MD        sodium chloride (PF) 0.9% PF flush 3 mL  3 mL Intracatheter q1 min prn Anmol Neff MD        sodium chloride (PF) 0.9% PF flush 3 mL  3 mL Intracatheter Q8H Anmol Neff MD   3 mL at 04/28/24 0407    sodium chloride (PF) 0.9% PF flush 3 mL  3 mL Intracatheter Q8H Felicity Gordillo MD        sodium chloride (PF) 0.9% PF flush 3 mL  3 mL Intracatheter q1 min prn Felicity Gordillo MD        sodium chloride (PF) 0.9% PF flush 3 mL  3 mL Intracatheter Q8H Felicity Gordillo MD        sodium chloride (PF) 0.9% PF flush 3 mL  3 mL Intracatheter q1 min prn Felicity Gordillo MD        sodium chloride 0.9 % infusion   Intravenous Continuous Felicity Gordillo MD 50 mL/hr at 04/28/24 0317 New Bag at 04/28/24 0317    tiotropium (SPIRIVA RESPIMAT) inhalation aerosol 1 puff  1 puff Inhalation Daily Yoshi Linda MD         Allergies   Allergies   Allergen Reactions    Amoxicillin Swelling  "      Social History    reports that she has been smoking. She has never used smokeless tobacco. She reports that she does not drink alcohol and does not use drugs.      Family History     No family history of premature CAD.  There is history of diabetes in the family       Review of Systems   A comprehensive review of system was performed and is negative other than that noted in the HPI or here.     Physical Exam   Vital Signs with Ranges  Temp:  [97.7  F (36.5  C)-98.4  F (36.9  C)] 97.9  F (36.6  C)  Pulse:  [] 94  Resp:  [10-54] 34  BP: ()/() 109/55  FiO2 (%):  [45 %-60 %] 45 %  SpO2:  [89 %-100 %] 93 %  Wt Readings from Last 4 Encounters:   04/28/24 61.3 kg (135 lb 2.3 oz)   06/21/23 53.5 kg (118 lb)   08/22/22 54 kg (119 lb)   08/17/22 54.4 kg (120 lb)     I/O last 3 completed shifts:  In: 135.83 [I.V.:135.83]  Out: -       Vitals: /55 (BP Location: Left arm, Patient Position: Semi-Quinonez's, Cuff Size: Adult Regular)   Pulse 94   Temp 97.9  F (36.6  C) (Oral)   Resp (!) 34   Wt 61.3 kg (135 lb 2.3 oz)   SpO2 93%   BMI 21.81 kg/m      Physical Exam:   General - Alert and oriented to time place and person in no acute distress  Eyes - No scleral icterus  HEENT - Neck supple, moist mucous membranes  Cardiovascular -regular S1 and S2 without murmurs  Extremities - There is no edema  Respiratory -scattered rales, decreased air entry lower zones with wheezes  Skin - No pallor or cyanosis  Gastrointestinal - Non tender and non distended without rebound or guarding  Psych - Appropriate affect   Neurological - No gross motor neurological focal deficits    No lab results found in last 7 days.    Invalid input(s): \"TROPONINIES\"    Recent Labs   Lab 04/28/24  0634 04/28/24  0403 04/28/24  0111   WBC 8.0  --  10.9   HGB 13.1  --  15.6   MCV 90  --  90     --  238     --  137   POTASSIUM 4.2  --  4.0   CHLORIDE 107  --  100   CO2 22  --  25   BUN 9.8  --  7.1*   CR 0.75  --  0.73 " "  GFRESTIMATED 87  --  90   ANIONGAP 11  --  12   EARL 8.1*  --  9.1   * 150* 116*   ALBUMIN 3.9  --  4.5   PROTTOTAL 6.3*  --  7.3   BILITOTAL 0.3  --  0.6   ALKPHOS 85  --  101   ALT 12  --  15   AST 19  --  22     Recent Labs   Lab Test 04/28/24  0634   CHOL 148   HDL 48*   LDL 93   TRIG 37     Recent Labs   Lab 04/28/24  0634 04/28/24  0111   WBC 8.0 10.9   HGB 13.1 15.6   HCT 39.3 45.5   MCV 90 90    238     Recent Labs   Lab 04/28/24  0634 04/28/24  0111   PHV 7.27* 7.34   PO2V 72* 47   PCO2V 53* 48   HCO3V 24 26     Recent Labs   Lab 04/28/24  0111   NTBNPI 1,652*     Recent Labs   Lab 04/28/24  0113   DD 0.43     No results for input(s): \"SED\", \"CRP\" in the last 168 hours.  Recent Labs   Lab 04/28/24  0634 04/28/24  0111    238     No results for input(s): \"TSH\" in the last 168 hours.  No results for input(s): \"COLOR\", \"APPEARANCE\", \"URINEGLC\", \"URINEBILI\", \"URINEKETONE\", \"SG\", \"UBLD\", \"URINEPH\", \"PROTEIN\", \"UROBILINOGEN\", \"NITRITE\", \"LEUKEST\", \"RBCU\", \"WBCU\" in the last 168 hours.    Imaging:  Recent Results (from the past 48 hour(s))   XR Chest Port 1 View    Narrative    EXAM: XR CHEST PORT 1 VIEW  LOCATION: [facility]  DATE: 4/28/2024    INDICATION: Shortness of breath  COMPARISON: 09/03/2021      Impression    IMPRESSION: Hyperinflated lung redemonstrated. No consolidation, pleural effusion or pneumothorax. Heart size and pulmonary vascularity are normal.       Echo:  No results found for this or any previous visit (from the past 4320 hour(s)).    Clinically Significant Risk Factors Present on Admission          # Hypocalcemia: Lowest Ca = 8.1 mg/dL in last 2 days, will monitor and replace as appropriate                  # COPD: noted on problem list     COPD    Pulmonary Heart Disease (Pulmonary hypertension or Cor pulmonale): Pulmonary Hypertension, unspecified    Chronic Fatigue and Other Debilities: Chronic fatigue, unspecified                "

## 2024-04-28 NOTE — PHARMACY-ADMISSION MEDICATION HISTORY
Pharmacy Intern Admission Medication History    Admission medication history is complete. The information provided in this note is only as accurate as the sources available at the time of the update.    Information Source(s): Patient and CareEverywhere/SureScripts via in-person    Pertinent Information:   -Pt stated taking Ellipta and Spiriva, but there was no refill history on SureScript     Changes made to PTA medication list:  Added:   Ellipta  Spiriva  Deleted: None  Changed: None    Allergies reviewed with patient and updates made in EHR: yes    Medication History Completed By: Patricia Phillips 4/28/2024 11:30 AM    PTA Med List   Medication Sig Last Dose    albuterol (PROAIR HFA/PROVENTIL HFA/VENTOLIN HFA) 108 (90 Base) MCG/ACT inhaler Inhale 1-2 puffs into the lungs every 6 hours as needed for shortness of breath / dyspnea or wheezing prn    fluticasone-vilanterol (BREO ELLIPTA) 100-25 MCG/ACT inhaler Inhale 1 puff into the lungs daily 4/27/2024    tiotropium (SPIRIVA RESPIMAT) 2.5 MCG/ACT inhaler Inhale 1 puff into the lungs daily 4/27/2024

## 2024-04-28 NOTE — ED TRIAGE NOTES
Pt BIBA from home. Pt woke up with severe SOB. Upon EMS arrival Pt was found to be SOB, tripoding, and O2 sats 80%. EMS placed Pt on CPAP and gave 2 duonebs and 125mg of Solumedrol. Pt had a temp of 104F.      Triage Assessment (Adult)       Row Name 04/28/24 0106          Triage Assessment    Airway WDL WDL        Respiratory WDL    Rhythm/Pattern, Respiratory shortness of breath;tachypneic  Pt tripoding upon arrival        Cardiac WDL    Pulse Rate & Regularity tachycardic        Cognitive/Neuro/Behavioral WDL    Cognitive/Neuro/Behavioral WDL WDL

## 2024-04-28 NOTE — PROGRESS NOTES
"After midnight hospital admission by my colleague, Dr. Gordillo, with history and physical from today reviewed.  Tatianna Leo was seen and examined, lying in bed, initially on BiPAP, without new complaints.  Feels \"100%\" better compared to prior to admission.  Admission concerns of acute hypoxic and hypercarbic respiratory failure due to acute COPD exacerbation and RSV infection.  Probable non-ST elevation myocardial infarction with history of chest pain in the setting of respiratory failure.  Cardiology consulted and continues on IV heparin, started on admission.  Echocardiogram requested.  In addition to cardiology consult, palliative care also consulted next available to review goals of care, ordered by admitting provider.  Care plan as outlined in admission history and physical.  Plan reviewed with nursing staff and patient at the bedside.  Change in hospitalist provider tomorrow with one of my [facility] hospitalist colleagues assuming care.  "

## 2024-04-28 NOTE — H&P
Assessment / Plan    68F hx COPD presenting with respiratory failure requiring BIPAP. Admitted for RSV, possible COPD exacerbation, and NSTEMI.     Acute Hypoxic & Hypercapnic Respiratory failure 2/2  COPD exacerbation  RSV Infection  Active Nicotine Use  ``Placed on CPAP by EMS and given duonebs, on arrival patient was tripoding, and was hesitant about BIPAP so given ativan which helped make the patient amendable to BIPAP  ``When assessed by nocturnist, weaned off of Oxygen, resulting in o2 saturation of 92, but patient became dyspnic  ``Examination reveals course inspiratory>expiratory breath sounds. CXR shows hyperinflation  ``Labs showing no WBC, and negative Ddimer. Co2 48 (though was obtained after 10min of CPAP in the ambulance)   --duonebs prn  --oxygen (goal is 88-92 given hx COPD)  --persistent oximetry  --IMC  --BIPAP  --repeat VBG in AM  --consider O2 walk test  --Palliative consult for hospice referral per patient's request  --Nicotine protocol  --CAP tx w/ azithro/rocephin due to hx of COPD and acute SOB/Coughing  --resume breo/spiriva once doses confirmed  --RSV precautions    NSTEMI, Likely Type 1  Hx of recurrent chest pain  ``Patient presented to the ER due to having CP when waking up, associated with SOB  ``Labs showing BNP 1600s with a trop of 26  ``EKG showing R axis decitaiton, Right atrial enlargement, and possible STD in the inferior leads  ``No active CP. Given high dose aspirin in the ER  ``While trop likely 2/2 demand ischemia in the setting of respiratory distress, given that the reason the patient decided to present to the ER was waking up with chest pain, it would be safer to treat as Type 1 NSTEMI.   --cardiology consult  --heparin drip  --NPO, IVF  --repeat trop  --telemetry  --repeat EKG in AM  --A1c, Lipid    Supportive Care  DVT ppx: Heparin drip  Diet: NPO  Pain Control: tylenol  Upper GI ppx: zofran  Lower GI ppx: senna  Lines/Catheters: IV  TTE/Dopplers:  TTE  Contact/Seizure/Fall/Delerium Precautions: None  PT/OT/Social/Wound/Palliative Consults: Palliative  Code Status: DNR DNI    History of Present Illness  Patient reports having progressive SOB for 24hrs, associated with nonproductive coughs. States that she presented to the ER due to waking up with CP and palpitations (had a similar episode 1 week prior to arrival that last 5 min with patient reporting that saturation was in the 70s, HR in the 140s). Endorses fevers (101 at home) along with weakness. No recent travel/sick contacts. In the ER, Chest pain had resolved, but dyspnea persisted. 1 pack a day of nicotine for 50yrs.    ROS: 10 point ROS neg other than the symptoms noted above in the HPI.     Objective History  /58   Pulse 112   Temp 97.7  F (36.5  C) (Axillary)   Resp 20   Wt 61.3 kg (135 lb 2.3 oz)   SpO2 95%   BMI 21.81 kg/m      Constitutional: Alert and oriented to person, place and time; in mild distress .   HEENT: Normocephalic, no scleral icterus  Abdomen: soft, no distention/tenderness/guarding  Lungs: coarse inspiratory>expiratory breath sounds with tachypnea  Heart: tachycardic no evidence of murmurs  Extremities/Neuro:No focal strength/sensation deficits, no LE edema  Skin: No obvious signs of skin breakdown  Psychiatric: appropriate affect, insight and judgment    I have personally spent 83 minutes total time today in preparing to see the patient (eg, review of tests), obtaining and/or reviewing separately obtained history, performing a medically appropriate examination and/or evaluation, counseling and educating the patient/family/caregiver, ordering medications, tests, or procedures, referring and communicating with other health care professionals, documenting clinical information in the electronic or other health record, independently interpreting results and communicating results to the patient/ family/caregiver and care coordination.

## 2024-04-28 NOTE — PLAN OF CARE
Goal Outcome Evaluation:      Plan of Care Reviewed With: patient    Overall Patient Progress: improvingOverall Patient Progress: improving    Outcome Evaluation: Patient verbalized improvement of breathing pattern.      04:00 Received patient from ER with RSV, possible COPD exacerbation, and NSTEMI. On Droplet Precaution.    Orientations: Alert and oriented x 4  Vitals/Pain: Vital signs stable,on BIPAP at 45 % FIO2.Denies pain.  Tele: NSR  Diet:NPO  Lungs: Diminished,with inspiratory wheezing  Lines/Drains: R PIV infusing with Heparin Drip x 750 units/hr,NS x 50 ml/hr  Skin/Wounds: WNL  GI/: No BM on this shift,passing gas.Last void at 2 am as per patient,Bladder scan at 6 am with 256 ml urine residual.  Labs: Abnormal/Trends, Electrolyte Replacement- on Hep Xa monitoring  Ambulation/Assist: SBA  Sleep Quality: fair  Plan: Continue plan of care,wean from BIPAP.For Palliative consult and Cardiology.

## 2024-04-28 NOTE — ED PROVIDER NOTES
History   Chief Complaint:  Shortness of Breath     HPI   Tatianna Leo is a 68 year old female with a history of emphysema who presents with shortness of breath progressive in nature over the last 2 days.  Patient notes that she developed significant difficulty breathing this evening.  She does note that 1 week ago she had some palpitations and associated chest pain although that resolved after several minutes.  She does not typically use oxygen at home.  Given the severe worsening of her shortness of breath symptoms today she called 911 as she was not able to breathe.  EMS reports that she was tripoding and having poor air movement bilaterally.  She was initially trialed on CPAP and route but became quite anxious and was unable to tolerate CPAP.  She was given 2 DuoNebs and arrival and also given 125 mcg of Solu-Medrol.  Patient denies any active chest pain on arrival in the emergency department.  She denies abdominal pain, vomiting or diarrhea.  She denies personal history of heart disease and denies any lower extremity swelling.    Independent Historian:   EMS - They report some of the history above.    Review of External Notes:   I reviewed the patient's discharge summary from 5/9/2021.  Patient was discharged due to acute hypoxic and hypercapnic respiratory failure and COPD exacerbation.  Patient was also found to be hypokalemic during that hospitalization.    Medications:    albuterol (PROAIR HFA/PROVENTIL HFA/VENTOLIN HFA) 108 (90 Base) MCG/ACT inhaler      Past Medical History:    Past Medical History:   Diagnosis Date    COPD (chronic obstructive pulmonary disease) (H)      Past Surgical History:    Past Surgical History:   Procedure Laterality Date    CHOLECYSTECTOMY      HYSTERECTOMY        Physical Exam   Patient Vitals for the past 24 hrs:   BP Temp Temp src Pulse Resp SpO2 Weight   04/28/24 0250 -- -- -- 108 22 96 % --   04/28/24 0230 120/58 -- -- 112 20 95 % --   04/28/24 0215 113/54 -- -- --  -- -- --   04/28/24 0200 109/52 -- -- 106 10 98 % --   04/28/24 0145 116/57 -- -- 108 (!) 32 99 % --   04/28/24 0142 121/59 -- -- 109 18 99 % --   04/28/24 0131 -- -- -- -- -- 98 % --   04/28/24 0128 -- -- -- -- 20 99 % --   04/28/24 0120 131/64 -- -- 115 26 98 % --   04/28/24 0118 -- 97.7  F (36.5  C) Axillary -- -- -- --   04/28/24 0114 -- -- -- -- -- -- 61.3 kg (135 lb 2.3 oz)   04/28/24 0113 -- -- -- (!) 138 (!) 41 98 % --   04/28/24 0110 (!) 194/131 -- -- (!) 152 (!) 54 (!) 89 % --      General: Alert, appears elderly, frail. Cooperative.     In moderate respiratory distress with tripoding  HEENT:  Head:  Atraumatic  Ears:  External ears are normal  Mouth/Throat:  Oropharynx is without erythema or exudate and mucous membranes are dry.   Eyes:   Conjunctivae normal and EOM are normal. No scleral icterus.  CV:  Tachycardic rate, regular rhythm, normal heart sounds and radial pulses are 2+ and symmetric.  No murmur.  Resp:  Breath sounds are distant bilaterally with poor air movement on both inspiration and expiration.  Initial tripoding on exam, improved with BIPAP.    GI:  Abdomen is soft, no distension, no tenderness. No rebound or guarding.  No CVA tenderness bilaterally  MS:  Normal range of motion. No edema.    Back atraumatic.    No midline cervical, thoracic, or lumbar tenderness  Skin:  Warm and dry.  No rash or lesions noted.  Neuro:   Alert. Normal strength.  GCS: 15  Psych: Normal mood and affect.    Emergency Department Course     ECG results from 04/28/24   EKG 12 lead     Value    Systolic Blood Pressure     Diastolic Blood Pressure     Ventricular Rate 117    Atrial Rate 117    KY Interval 132    QRS Duration 76        QTc 446    P Axis 90    R AXIS 97    T Axis 71    Interpretation ECG      ** Suspect arm lead reversal, interpretation assumes no reversal  Sinus tachycardia  Right atrial enlargement  Rightward axis  Pulmonary disease pattern  Nonspecific ST abnormality  Abnormal ECG  When  compared with ECG of 28-APR-2024 01:11, (unconfirmed)  No significant change was found  Confirmed by GENERATED REPORT, COMPUTER ([id number]),  Yenny Castillo ([id number]) on 4/28/2024 2:15:02 AM       Imaging:  XR Chest Port 1 View   Final Result   IMPRESSION: Hyperinflated lung redemonstrated. No consolidation, pleural effusion or pneumothorax. Heart size and pulmonary vascularity are normal.      Echocardiogram Complete    (Results Pending)      Report per radiology    Laboratory:  Labs Ordered and Resulted from Time of ED Arrival to Time of ED Departure   COMPREHENSIVE METABOLIC PANEL - Abnormal       Result Value    Sodium 137      Potassium 4.0      Carbon Dioxide (CO2) 25      Anion Gap 12      Urea Nitrogen 7.1 (*)     Creatinine 0.73      GFR Estimate 90      Calcium 9.1      Chloride 100      Glucose 116 (*)     Alkaline Phosphatase 101      AST 22      ALT 15      Protein Total 7.3      Albumin 4.5      Bilirubin Total 0.6     TROPONIN T, HIGH SENSITIVITY - Abnormal    Troponin T, High Sensitivity 26 (*)    NT PROBNP INPATIENT - Abnormal    N terminal Pro BNP Inpatient 1,652 (*)    INFLUENZA A/B, RSV, & SARS-COV2 PCR - Abnormal    Influenza A PCR Negative      Influenza B PCR Negative      RSV PCR Positive (*)     SARS CoV2 PCR Negative     ISTAT GASES LACTATE VENOUS POCT - Abnormal    Lactic Acid POCT 1.0      Bicarbonate Venous POCT 26      O2 Sat, Venous POCT 80 (*)     pCO2 Venous POCT 48      pH Venous POCT 7.34      pO2 Venous POCT 47      Base Excess/Deficit (+/-) POCT 0.0     PROCALCITONIN - Normal    Procalcitonin 0.09     D DIMER QUANTITATIVE - Normal    D-Dimer Quantitative 0.43     CBC WITH PLATELETS AND DIFFERENTIAL    WBC Count 10.9      RBC Count 5.07      Hemoglobin 15.6      Hematocrit 45.5      MCV 90      MCH 30.8      MCHC 34.3      RDW 11.9      Platelet Count 238      % Neutrophils 64      % Lymphocytes 27      % Monocytes 9      % Eosinophils 0      % Basophils 0      % Immature  Granulocytes 0      NRBCs per 100 WBC 0      Absolute Neutrophils 6.9      Absolute Lymphocytes 2.9      Absolute Monocytes 1.0      Absolute Eosinophils 0.0      Absolute Basophils 0.0      Absolute Immature Granulocytes 0.0      Absolute NRBCs 0.0     ROUTINE UA WITH MICROSCOPIC   TROPONIN T, HIGH SENSITIVITY   BLOOD CULTURE   BLOOD CULTURE        Procedures     Emergency Department Course & Assessments:       Interventions:  Medications   sodium chloride (PF) 0.9% PF flush 3 mL (has no administration in time range)   sodium chloride (PF) 0.9% PF flush 3 mL (has no administration in time range)   azithromycin (ZITHROMAX) 500 mg vial to attach to  mL bag (has no administration in time range)   cefTRIAXone (ROCEPHIN) 1 g vial to attach to  mL bag for ADULTS or NS 50 mL bag for PEDS (has no administration in time range)   ipratropium - albuterol 0.5 mg/2.5 mg/3 mL (DUONEB) neb solution 3 mL (has no administration in time range)   sodium chloride 0.9 % infusion (has no administration in time range)   heparin loading dose for LOW INTENSITY TREATMENT * Give BEFORE starting heparin infusion (has no administration in time range)   heparin 25,000 units in 0.45% NaCl 250 mL ANTICOAGULANT infusion (has no administration in time range)   ondansetron (ZOFRAN) injection 4 mg (4 mg Intravenous $Given 4/28/24 0110)   LORazepam (ATIVAN) injection 0.5 mg (0.5 mg Intravenous $Given 4/28/24 0112)   acetaminophen (TYLENOL) Suppository 650 mg (650 mg Rectal $Given 4/28/24 0131)   cefTRIAXone (ROCEPHIN) 2 g vial to attach to  ml bag for ADULTS or NS 50 ml bag for PEDS (0 g Intravenous Stopped 4/28/24 0205)   azithromycin (ZITHROMAX) 500 mg vial to attach to  mL bag (500 mg Intravenous $New Bag 4/28/24 0206)   sodium chloride 0.9% BOLUS 1,000 mL (1,000 mLs Intravenous $New Bag 4/28/24 0115)   ipratropium - albuterol 0.5 mg/2.5 mg/3 mL (DUONEB) neb solution 3 mL ( Nebulization Not Given 4/28/24 0129)   aspirin  (ASA) chewable tablet 324 mg (324 mg Oral $Given 4/28/24 0220)        Independent Interpretation (X-rays, CTs, rhythm strip):  I independently reviewed chest x-ray imaging which shows no evidence of pneumothorax or pneumonia    Consultations/Discussion of Management or Tests:   ED Course as of 04/28/24 0306   Sun Apr 28, 2024   0224 I spoke with Dr. Elam of the hospitalist service who agreed to admission.        Social Determinants of Health affecting care:   None    Disposition:  The patient was admitted to the hospital under the care of Dr. Gordillo.     Impression & Plan    CMS Diagnoses: None    Medical Decision Making:  Patient is a 68-year-old female with a history of emphysema who presents with shortness of breath.  She notes that her symptoms began approximately 2 days ago and became very severe early this morning.  EMS reported tripoding and hypoxia at the scene.  She was given 2 DuoNebs and Solu-Medrol prehospital.  Patient was unable to tolerate CPAP and was quite anxious on arrival.  She was tachycardic with sinus tachycardia.  She was quite hypoxic as well on room air.  We were able to give the patient a dose of IV Ativan at 0.5 mg which adequately controlled her anxiety.  We were then able to apply BiPAP for hypoxia and tachypnea.  She continued to have poor air movement and so an additional DuoNeb was provided with ongoing BiPAP.  Patient did have a fever on arrival and so was given a dose of rectal Tylenol.  Broad septic workup was pursued given high concern for COPD exacerbation with possible sepsis.  Patient was given Rocephin and azithromycin while initial workup ensued given the critical nature of her presentation.  Chest x-ray obtained did not show any focal pneumonia although this could be a slightly limited examination given a single view portable chest x-ray.  Blood cultures were obtained.  Viral swab did ultimately returned positive for RSV.  High suspicion for COPD exacerbation  secondary to RSV infection.  Antibiotics likely would not need to be continued upon admission.  Patient appears much improved after application of BiPAP.  Thankfully her initial VBG did not show any significant hypercarbia.  Will continue with BiPAP given the acute hypoxic respiratory failure on arrival.  Patient will be admitted for ongoing COPD exacerbation and treatment of RSV.  Patient did report about 1 week ago having palpitations and chest discomfort symptoms but she is not having chest pain today.  EKG showed sinus tachycardia but no concerning ischemic changes.  Troponin was faintly elevated although suspect demand ischemia in the setting of acute hypoxic respiratory failure with COPD exacerbation.  Will plan for serial troponin studies but low concern for NSTEMI at this time.  I would not heparinize the patient at this time.  Patient was given a dose of aspirin here.  I spoke with Dr. Gordillo of the hospitalist service who agreed to [facility] admission.    Critical Care time:  was 40 minutes for this patient excluding procedures.    Diagnosis:    ICD-10-CM    1. Acute respiratory failure with hypoxia (H)  J96.01       2. Shortness of breath  R06.02       3. COPD exacerbation (H)  J44.1       4. Demand ischemia (H)  I24.89       5. RSV infection  B33.8            Discharge Medications:  New Prescriptions    No medications on file      4/28/2024   Anmol Neff MD White, Scott, MD  04/28/24 [id number]

## 2024-04-28 NOTE — PROVIDER NOTIFICATION
MD Notification    Notified Person: MD    Notified Person Name: Dr. Linda    Notification Date/Time: 1703, 4/28    Notification Interaction: Vocera Message    Purpose of Notification: VBG ordered for increasing SOB - Patient wanting to discuss goals of care     Orders Received: Lorazepam ordered for increasing anxiety     Comments:

## 2024-04-28 NOTE — ED NOTES
Bed: [facility]  Expected date: 4/28/24  Expected time: 1:05 AM  Means of arrival: Ambulance  Comments:  HEMS 444 [id number] respiratory distress; cpap, nebs

## 2024-04-28 NOTE — PLAN OF CARE
Orientation: A/Ox4    Vitals/Tele: VSS - Normotensive, LSD/coarse - Oxymask for increased work of breathing/tachypnea at end of shift, afebrile, SR/ST    IV Access/drains: 1 PIV - Heparin at 700 and NS at 50 running continuous    Diet: NPO while on BiPAP - Regular diet off BiPAP    Mobility: SBA    GI/: BS audible, adequate UO via toilet    Wound/Skin: WDL    Consults: Palliative, Cards     Discharge Plan: Pending       See Flow sheets for assessment        Goal Outcome Evaluation:

## 2024-04-29 NOTE — SIGNIFICANT EVENT
Significant Event Note    Records reviewed from past 48 hours. I have noted inpatient hospice referral was placed.  Cardiology to sign off. Please page with questions.    Bibi Musa MD

## 2024-04-29 NOTE — PROGRESS NOTES
Spoke to pt RN who stated spouse was no longer at the bedside. Writer reached out to spouse and he will be back at hospital around 2 pm. He said he would like to talk to the hospital team before setting something up and will call us back. We will have ml Tirado swing by around 230 and complete info visit if pt/spouse is interested                           Thank you,  Miesha Julio   [facility] Admissions Coordinator

## 2024-04-29 NOTE — PROGRESS NOTES
[facility]    Medicine Progress Note - Hospitalist Service    Date of Admission:  4/28/2024    Assessment & Plan     Tatianna Leo is a 68-year-old female with history of COPD, who presented on 4/28/2024 with worsening shortness of breath.  She was in respiratory distress on arrival and placed on BiPAP.  Patient did not tolerate BiPAP well and expressed desire to switch to comfort care/hospice.     Acute hypoxic and hypercapnic respiratory failure -secondary to RSV infection and COPD exacerbation  - Initial VBG's showed pH 7.27, pCO2 53 --> 60.  Started on BiPAP which she did not tolerate well  - BiPAP discontinued to align with patient's goals.  Continue supplemental oxygen for comfort  - Consult hospice GIP to see if patient is a candidate for inpatient hospice secondary to significant shortness of breath    Acute COPD exacerbation - due to RSV bronchitis  - Continue Breo inhaler, Incruse inhaler, DuoNebs as needed  - Continue lorazepam as needed  - Start on morphine 10 mg every 2 hours as needed for comfort  - Will also start on prednisone    Acute infection due to confirmed RSV  - Chest x-ray showed hyperinflated lungs.  No consolidation, effusion or pneumothorax.  - Tested positive for RSV on 4/28.    - No leukocytosis or left shift, blood cultures negative.  Procalcitonin 0.19, lactate 1.  No concern for bacterial pneumonia.  Was started on ceftriaxone and azithromycin on admission, will discontinue  - continue Robitussin DM cough syrup      Tobacco use disorder  - continues to smoke.  Continue nicotine patch    Probable acute cystitis  - UA showed 128 WBCs, large leukocyte esterase, negative nitrites  - Antibiotics discontinued as patient now on comfort cares    Acute MI due to NSTEMI, type I vs type II vs focal myocarditis  Elevated troponin  - Complaint of chest pain on arrival to ER.  Troponin mildly elevated with flat trend, 26 --> 24 --> 23. proBNP 1652  - EKG showed sinus  tachycardia, right axis and RV enlargement.  No concerning ischemic changes  - Echo 4/28 showed normal LVEF of 60-65%, small area of mid anterior hypokinesis  - Started on IV heparin infusion and cardiology consulted.  Cardiology felt chest pain most likely secondary to hypoxia but due to wall motion abnormality on echo, NSTEMI microinfarction or focal myocarditis is also in the differential.  Coronary angiography was recommended when patient would be more stable.   - Now that patient wants to transition to comfort cares, will defer angiogram.  Discontinue IV heparin.  Continue aspirin 81 mg daily                Diet: Combination Diet Regular Diet Adult    DVT Prophylaxis: Now on comfort cares, no DVT prophylaxis needed  Batres Catheter: Not present  Lines: None     Cardiac Monitoring: None  Code Status: No CPR- Do NOT Intubate      Clinically Significant Risk Factors          # Hypocalcemia: Lowest Ca = 7.8 mg/dL in last 2 days, will monitor and replace as appropriate                    # COPD: noted on problem list        Disposition Plan         Medically Ready for Discharge: Anticipated in 2-4 Days             Karoline Whipple MD  Hospitalist Service  [facility]  Securely message with [employer] (more info)  Text page via [employer] Paging/Directory   ______________________________________________________________________    Interval History   Patient seen this morning.  Reports she is very uncomfortable and short of breath.  She does not want any BiPAP.  Reports she wants to transition immediately to comfort cares.  Family present in room and support patient's decision.    We discussed that there is a possibility she may improve once RSV improves    Physical Exam   Vital Signs: Temp: 99.2  F (37.3  C) Temp src: Axillary BP: (!) 156/71 Pulse: 105   Resp: (!) 34 SpO2: 100 % O2 Device: Oxymask Oxygen Delivery: 8 LPM  Weight: 135 lbs 2.27 oz      Constitutional -lying in bed, appears uncomfortable    Cardiovascular - regular rate and rhythm, no murmurs, no edema  Pulmonary -, prolonged expiration, coarse breath sounds  Integumentary - skin is warm and dry, no rashes or ulcers  Neurological - awake, alert and oriented x3.  Moving all 4 extremities, normal speech, no focal deficits    Medical Decision Making       55 MINUTES SPENT BY ME on the date of service doing chart review, history, exam, documentation & further activities per the note.      Data     I have personally reviewed the following data over the past 24 hrs:    10.1  \   12.6   / 174     139 105 13.3 /  104 (H)   4.3 25 0.75 \     Trop: 23 (H) BNP: N/A     Procal: 0.19 CRP: N/A Lactic Acid: N/A         Imaging results reviewed over the past 24 hrs:   No results found for this or any previous visit (from the past 24 hour(s)).

## 2024-04-29 NOTE — PROVIDER NOTIFICATION
MD Notification    Notified Person: MD    Notified Person Name: Felicity Rahman    Notification Date/Time: April 29, 2024 @ 03:34    Notification Interaction: Baldev    Purpose of Notification:    Patient admitted for COPD exacerbation and RSV, she is having sob with air hunger and anxious. Placed on BIPAP and PRN Ativan 0.5 tab given but seems not working. Can we give something to make her more relax and comfortable. Thank you.    Orders Received: Ativan 1 mg ordered.    Comments:    14-Aug-2017 14-Aug-2017 08:40

## 2024-04-29 NOTE — PROGRESS NOTES
[facility]  _________________________________________________________________     [facility] 24/7 Contact Number: [phone]    - Providers: Please contact [facility] with changes in orders or clinical plan of care   - Nursing: Please contact [facility] with significant changes in patient condition     Hospice will notify the care team (including the hospitalist) to confirm date of inpatient hospice (GIP) admission.    New Epic encounter will not be created until hospice completes admission.     Received. Thank you for the referral. We will work on sending someone to meet with pt/family

## 2024-04-29 NOTE — PROGRESS NOTES
Respiratory care Note    Patient is on AVAPS due to increased work of breathing.    Neb given in line.    Patient admit to being anxious.    Will continue to follow.

## 2024-04-29 NOTE — PROVIDER NOTIFICATION
MD Notification    Notified Person: MD    Notified Person Name: Felicity Gordillo    Notification Date/Time: April 29,2024 @ 05:41 am    Notification Interaction: Baldev    Purpose of Notification:  Patient complaining of chest pain, Can I have an order for an EKG? and labs? IV pain meds, she still on BIPAP.     Orders Received:  See Providers notes    Comments:    Provider informed refusal of BIPAP.

## 2024-04-29 NOTE — PLAN OF CARE
Goal Outcome Evaluation:      Plan of Care Reviewed With: patient    Overall Patient Progress: decliningOverall Patient Progress: declining      19:00- 07:00    On Droplet Precautions    Orientations: Alert and oriented x 4  Vitals/Pain: Vital signs stable,on intermitent BIPAP at 45 % FIO2/Oxymask at 8lpm.Denies pain.Refused BIPAP at  6 am,providers aware.  Tele: NSR  Diet:Regular  Lungs: Diminished,with inspiratory wheezing.Increased work of breathing episodes and anxiety,Ativan given.  Lines/Drains: R PIV infusing with Heparin Drip x 850 units/hr,NS x 50 ml/hr,intermittent antibiotic  Skin/Wounds: WNL  GI/: No BM on this shift,Adequate urine output.  Labs: Abnormal/Trends, Electrolyte Replacement- on Hep Xa monitoring  Ambulation/Assist: SBA  Sleep Quality: fair  Plan: Continue plan of care,.For Palliative consult.

## 2024-04-29 NOTE — SIGNIFICANT EVENT
Significant Event Note    Time of event: 5:46 AM April 29, 2024    Description of event:  CP w/ dyspnea    Plan:  EKG, trop  PRN nitroglycerin    Addendum: no concerning ST changes. Duonebs as patient refusing BIPAP. Deferring HFNC given hx of COPD.     Felicity Gordillo MD

## 2024-04-29 NOTE — PROGRESS NOTES
Patient refused using BIPAP in spite of having shortness of breath and increased work of breathing.Encouragement and education given.Provider aware.

## 2024-04-29 NOTE — PLAN OF CARE
Orientation: A/Ox4     IV Access/drains: 2 PIV - SL     Diet: Regular diet     Mobility: SBA     GI/: BS audible, adequate UO via toilet/Purewick     Wound/Skin: WDL     Consults: Palliative     Discharge Plan: Pt Transitioned to Comfort care this AM - Cares performed to provide comfort to Pt        See Flow sheets for assessment    Problem: Gas Exchange Impaired  Goal: Optimal Gas Exchange  Outcome: Not Progressing  Intervention: Optimize Oxygenation and Ventilation  Recent Flowsheet Documentation  Taken 4/29/2024 0830 by Bruno Monroe, RN  Head of Bed (HOB) Positioning: HOB at 20-30 degrees   Goal Outcome Evaluation:

## 2024-04-30 PROBLEM — Z51.5 HOSPICE CARE: Status: ACTIVE | Noted: 2024-01-01

## 2024-04-30 NOTE — PLAN OF CARE
Pt Aox4, Ax1. Pt denies pain but complains of anxiety and cough- PRN's given per eMAR. Afebrile today. Pt on 5L oxymask for comfort.  at bedside and supportive of patient. Call light within reach, bed alarm on for safety.

## 2024-04-30 NOTE — CONSULTS
[facility]    Consult Note - [facility]  _________________________________________________________________    [facility] 24[facility] Contact Number: [phone]    - Providers: Please contact [facility] with changes in orders or clinical plan of care   - Nursing: Please contact [facility] with significant changes in patient condition    Hospice will notify the care team (including the hospitalist) to confirm date of inpatient hospice (GIP) admission.    New Epic encounter will not be created until hospice completes admission.   ______________________________________________________________________        Hospice Diagnosis: COPD    Indication for Inpatient Hospice: dyspnea, anxiety    Goals for Hospital Discharge: dyspnea managed AEB pateint not reporting dyspnea for 48 hours and anxiety managed AEB patient not reporting anxiety for 48 hours.    Plan of Care Discussed with the Following:   - Nurse: MARIANA Bingham  - Hospitalist/Rounding Provider: Dr. Sole Campuzano's Family/Preferred Contact: , Will  - Hospice Provider: Dr. Dominguez    Summary of Visit (includes assessment, medications and any new orders):   Met with patient and her , A&O x4, somnolent. Educated on hospice services, patient was previously a hospice nurse. Her goals are to return home with dyspnea managed. Discussed disease progression and depending on how she continues to do we may be able to facilitate her getting home. Consents signed and all questions answered to patient and family's satisfaction.     New orders:  Morphine 10mg Q4hrs SL scheduled  Lorazepam 1mg TID PO scheduled   Decadron 4mg every day scheduled      Cortney Verderame

## 2024-04-30 NOTE — PLAN OF CARE
Goal Outcome Evaluation:     Orientations: AOx4  Vitals/Pain: VSS, Pt on 5L O2 via oxymask, denies pain   Lines/Drains: LUE PIVx1   Skin/Wounds: Intact   GI/: WDL  LS: Diminished   Ambulation/Assist: Ax1 GB  Sleep Quality: Fair   Plan: GIP Hospice

## 2024-04-30 NOTE — H&P
[facility]    History and Physical - Hospitalist Service       Date of Admission:  4/30/2024    Assessment & Plan     Tatianna Leo is a 68-year-old female with history of COPD, who presented on 4/28/2024 with worsening shortness of breath due to acute COPD exacerbation secondary to acute RSV infection. She was in respiratory distress on arrival and placed on BiPAP.  Patient did not tolerate BiPAP well and expressed desire to switch to comfort care/hospice. Hospice GIP was consulted and she was transitioned to hospice GIP on 4/30/2024.      Acute hypoxic and hypercapnic respiratory failure -secondary to RSV infection and COPD exacerbation  - did not tolerate BiPAP  - Continue supplemental oxygen for comfort  - morphine and ativan for comfort      Acute COPD exacerbation - due to RSV bronchitis  - Continue Breo inhaler, Incruse inhaler, DuoNebs as needed, prednisone (started 4/29)  - Continue lorazepam as needed  - continue morphine for comfort     Acute infection due to confirmed RSV  - Chest x-ray showed hyperinflated lungs.  No consolidation, effusion or pneumothorax.  - Tested positive for RSV on 4/28.    - continue Robitussin DM cough syrup      Tobacco use disorder  - continues to smoke.  Continue nicotine patch     Acute MI due to NSTEMI, type I vs type II vs focal myocarditis  Elevated troponin  - Complainted of chest pain on arrival to ER on 4/28.  Troponin were mildly elevated with flat trend (26 --> 24 --> 23).   - EKG showed sinus tachycardia, right axis and RV enlargement.  No concerning ischemic changes  - Echo 4/28 showed normal LVEF of 60-65%, small area of mid anterior hypokinesis  - Cardiology felt chest pain most likely secondary to hypoxia but due to wall motion abnormality on echo, NSTEMI microinfarction or focal myocarditis also in the differential.  Coronary angiography was recommended but deferred as patient transitioned to hospice  -  Continue aspirin 81 mg  daily  - prn sublingual nitroglycerine for chest pain        Diet: Combination Diet Regular Diet Adult    Batres Catheter: Not present  Lines: None     Code Status: No CPR- Do NOT Intubate    Expected Discharge: working on discharge with hospice    Karoline Whipple MD  Hospitalist Service  [facility]  Securely message with [email or web address] (more info)  Text page via [employer] Paging/Directory     ______________________________________________________________________    Chief Complaint   Transitioning to inpatient hospice    History of Present Illness     Tatianna Loe is a 68-year-old female with history of COPD, who presented on 4/28/2024 with worsening shortness of breath due to acute COPD exacerbation secondary to acute RSV infection. She was in respiratory distress on arrival and placed on BiPAP.  Patient did not tolerate BiPAP well and expressed desire to switch to comfort care/hospice. Hospice GIP was consulted and she was transitioned to hospice GIP on 4/30/2024.        Physical Exam   Vital Signs: Temp: 98.4  F (36.9  C) Temp src: Axillary       Resp: 20        Weight: 0 lbs 0 oz    Constitutional -lying in bed, appears comfortable   Cardiovascular - regular rate and rhythm, no murmurs, no edema  Pulmonary - prolonged expiration, coarse breath sounds. Improved from yesterday  Integumentary - skin is warm and dry, no rashes or ulcers  Neurological - awake, alert and oriented x3.  Moving all 4 extremities, normal speech, no focal deficits    Medical Decision Making       40 MINUTES SPENT BY ME on the date of service doing chart review, history, exam, documentation & further activities per the note.

## 2024-04-30 NOTE — PROGRESS NOTES
Reason for Admission: COPD exacerbation    Evaluation of Goal:   Patient is A&Ox 4; patient drowsy at beginning of shift but more alert this morning. Temperature taken due to patient feeling warm to the touch; temperature 102.9 axillary tylenol given and ice packs placed on patient now temperature normalized. Patient given two doses of morphine due to labored breathing, but no other medications for comfort given. Patient is up with SBA, ambulated to the bathroom once. Patient turned for comfort but patient able to weight shift independently. Sleep Quality moderate. Patient scoring green on the Aggression Stoplight Tool. Pt calm and cooperative with cares, able to make needs known, call light within reach, bed alarm on for safety. Plan is inpatient hospice to evaluate the patient.     Karen Stephens RN on 4/30/2024 at 6:41 AM

## 2024-04-30 NOTE — PROGRESS NOTES
Informational visit with consents completed on 4.29 - SOC scheduled for 9 am 4.30.24                   Thank you,  Miesha Julio   [facility] Hospice Admissions Coordinator

## 2024-04-30 NOTE — PHARMACY-ADMISSION MEDICATION HISTORY
Admission medication history completed at [facility]. Please see Pharmacy Intern Admission Medication History note from 04/28/2024.

## 2024-04-30 NOTE — DISCHARGE SUMMARY
[facility]  Hospitalist Discharge Summary      Date of Admission:  4/28/2024  Date of Discharge:  4/30/2024  1:37 PM  Discharging Provider: Karoline Whipple MD  Discharge Service: Hospitalist Service    Discharge Diagnoses     Acute hypoxic and hypercapnic respiratory failure -secondary to RSV infection and COPD exacerbation     Acute COPD exacerbation - due to RSV bronchitis     Acute infection due to confirmed RSV     Tobacco use disorder     Probable acute cystitis     Acute MI due to NSTEMI, type I vs type II vs focal myocarditis  Elevated troponin    Clinically Significant Risk Factors          Follow-ups Needed After Discharge   {Additional follow-up instructions/to-do's for PCP    :    Unresulted Labs Ordered in the Past 30 Days of this Admission       Date and Time Order Name Status Description    4/28/2024  1:09 AM Blood Culture Peripheral Blood Preliminary     4/28/2024  1:09 AM Blood Culture Peripheral Blood Preliminary         These results will be followed up by     Discharge Disposition   Discharged to Hospice GIP  Condition at discharge: Poor    Hospital Course       Tatianna Leo is a 68-year-old female with history of COPD, who presented on 4/28/2024 with worsening shortness of breath.  She was in respiratory distress on arrival and placed on BiPAP.  Patient did not tolerate BiPAP well and expressed desire to switch to comfort care/hospice. Hospice GIP consulted and patient is being transitioned to hospice GIP on 4/30/2024.      Acute hypoxic and hypercapnic respiratory failure -secondary to RSV infection and COPD exacerbation  - Initial VBG's showed pH 7.27, pCO2 53 --> 60.  Started on BiPAP which she did not tolerate well  - BiPAP discontinued to align with patient's goals.  Continue supplemental oxygen for comfort  - Hospice GIP consulted. Transition to hospice GIP on 4/30     Acute COPD exacerbation - due to RSV bronchitis  - Continue Breo inhaler, Incruse inhaler,  DuoNebs as needed, prednisone (started 4/29)  - Continue lorazepam as needed  - continue morphine for comfort     Acute infection due to confirmed RSV  - Chest x-ray showed hyperinflated lungs.  No consolidation, effusion or pneumothorax.  - Tested positive for RSV on 4/28.    - No leukocytosis or left shift, blood cultures negative.  Procalcitonin 0.19, lactate 1.  No concern for bacterial pneumonia.  Was started on ceftriaxone and azithromycin on admission, discontinued on 4/29  - continue Robitussin DM cough syrup      Tobacco use disorder  - continues to smoke.  Continue nicotine patch     Probable acute cystitis  - UA showed 128 WBCs, large leukocyte esterase, negative nitrites  - Antibiotics discontinued as patient transitioned to hospice     Acute MI due to NSTEMI, type I vs type II vs focal myocarditis  Elevated troponin  - Complainted of chest pain on arrival to ER.  Troponin mildly elevated with flat trend, 26 --> 24 --> 23. proBNP 1652  - EKG showed sinus tachycardia, right axis and RV enlargement.  No concerning ischemic changes  - Echo 4/28 showed normal LVEF of 60-65%, small area of mid anterior hypokinesis  - Started on IV heparin infusion and cardiology was consulted.  Cardiology felt chest pain most likely secondary to hypoxia but due to wall motion abnormality on echo, NSTEMI microinfarction or focal myocarditis also in the differential.  Coronary angiography was recommended. Now deferred as patient transitioned to hospice  -  Continue aspirin 81 mg daily  - prn sublingual nitroglycerine for chest pain       Consultations This Hospital Stay   PALLIATIVE CARE ADULT IP CONSULT  CARDIOLOGY IP CONSULT  PHARMACY IP CONSULT  SMOKING CESSATION PROGRAM IP CONSULT  GIP INPATIENT HOSPICE ADULT CONSULT  SMOKING CESSATION PROGRAM IP CONSULT  PHARMACY IP CONSULT    Code Status   No CPR- Do NOT Intubate    Time Spent on this Encounter   IKaroline MD, personally saw the patient today and spent greater  than 30 minutes discharging this patient.       Karoline Whipple MD  [facility] INTERMEDIATE CARE  [address] [place] [address]  Phone: [phone]  ______________________________________________________________________    Physical Exam   Vital Signs: Temp: 98.8  F (37.1  C) Temp src: Axillary       Resp: 30 (morphine given)   O2 Device: Oxymask Oxygen Delivery: 5 LPM  Weight: 135 lbs 2.27 oz    Constitutional -lying in bed, appears comfortable   Cardiovascular - regular rate and rhythm, no murmurs, no edema  Pulmonary - prolonged expiration, coarse breath sounds. Improved from yesterday  Integumentary - skin is warm and dry, no rashes or ulcers  Neurological - awake, alert and oriented x3.  Moving all 4 extremities, normal speech, no focal deficits       Primary Care Physician   Danny Denton    Discharge Orders   No discharge procedures on file.    Significant Results and Procedures   Results for orders placed or performed during the hospital encounter of 24   XR Chest Port 1 View    Narrative    EXAM: XR CHEST PORT 1 VIEW  LOCATION: [facility]  DATE: 2024    INDICATION: Shortness of breath  COMPARISON: 2021      Impression    IMPRESSION: Hyperinflated lung redemonstrated. No consolidation, pleural effusion or pneumothorax. Heart size and pulmonary vascularity are normal.   Echocardiogram Complete     Value    LVEF  60-65%    Narrative    [id number]  [id number]  [id number]  [id number]^DENICE^[facility]     [facility]  Echocardiography Laboratory  [address] [place] [address]     Name: ALBA HOOK  MRN: [id number]  : 1955  Study Date: 2024 10:09 AM  Age: 68 yrs  Gender: Female  Patient Location: [facility]  Reason For Study: Chest Pain  Ordering Physician: TAWANNA GALDAMEZ  Referring Physician: Danny Denton  Performed By: Alexis Bullock     BSA: 1.7 m2  Height: 66 in  Weight: 135 lb  HR: 89  BP: 120/58  mmHg  ______________________________________________________________________________  Procedure  Complete Portable Echo Adult. Optison (NDC #[id number]) given intravenously.  ______________________________________________________________________________  Interpretation Summary     Left ventricular systolic function is normal.  The visual ejection fraction is 60-65%.  Small area of mid anterior hypokinesis.  The study was technically adequate. There is no comparison study available.  ______________________________________________________________________________  Left Ventricle  The left ventricle is normal in size. There is normal left ventricular wall  thickness. Left ventricular systolic function is normal. The visual ejection  fraction is 60-65%. Left ventricular diastolic function is indeterminate.  Small area of mid anterior hypokinesis.     Right Ventricle  The right ventricle is normal size. The right ventricular systolic function is  normal.     Atria  Normal left atrial size. Borderline right atrial enlargement.     Mitral Valve  There is trace mitral regurgitation.     Tricuspid Valve  There is trace tricuspid regurgitation. The right ventricular systolic  pressure is approximated at 25.5 mmHg plus the right atrial pressure. IVC  diameter and respiratory changes fall into an intermediate range suggesting an  RA pressure of 8 mmHg.     Aortic Valve  There is mild trileaflet aortic sclerosis. No aortic stenosis is present.     Pulmonic Valve  The pulmonic valve is not well visualized.     Vessels  The aortic root is normal size.     Pericardium  There is no pericardial effusion.     Rhythm  Sinus rhythm was noted.  ______________________________________________________________________________  MMode/2D Measurements & Calculations  IVSd: 0.74 cm     LVIDd: 3.8 cm  LVIDs: 2.4 cm  LVPWd: 0.87 cm  FS: 37.0 %  LV mass(C)d: 87.3 grams  LV mass(C)dI: 51.6 grams/m2  Ao root diam: 3.1 cm  LA dimension: 2.7 cm  asc  Aorta Diam: 2.8 cm  LA/Ao: 0.85  LVOT diam: 2.0 cm  LVOT area: 3.2 cm2  Ao root diam index Ht(cm/m): 1.9  Ao root diam index BSA (cm/m2): 1.8  Asc Ao diam index BSA (cm/m2): 1.6  Asc Ao diam index Ht(cm/m): 1.7  LA Volume (BP): 42.7 ml     LA Volume Index (BP): 25.3 ml/m2  RWT: 0.45  TAPSE: 2.5 cm     Doppler Measurements & Calculations  MV E max jhonny: 84.0 cm/sec  MV A max jhonny: 75.4 cm/sec  MV E/A: 1.1  MV dec slope: 627.6 cm/sec2  MV dec time: 0.13 sec  PA acc time: 0.16 sec  TR max jhonny: 252.5 cm/sec  TR max P.5 mmHg  E/E' av.4  Lateral E/e': 8.7  Medial E/e': 8.1  RV S Jhonny: 17.2 cm/sec     ______________________________________________________________________________  Report approved by: Nichole John 2024 11:53 AM             Discharge Medications   Discharge Medication List as of 2024  1:37 PM        CONTINUE these medications which have NOT CHANGED    Details   albuterol (PROAIR HFA/PROVENTIL HFA/VENTOLIN HFA) 108 (90 Base) MCG/ACT inhaler Inhale 1-2 puffs into the lungs every 6 hours as needed for shortness of breath / dyspnea or wheezing, Historical      fluticasone-vilanterol (BREO ELLIPTA) 100-25 MCG/ACT inhaler Inhale 1 puff into the lungs daily, Historical      tiotropium (SPIRIVA RESPIMAT) 2.5 MCG/ACT inhaler Inhale 1 puff into the lungs daily, Historical           Allergies   Allergies   Allergen Reactions    Amoxicillin Swelling

## 2024-05-01 NOTE — CONSULTS
Care Management Initial Consult    General Information  Assessment completed with: Patient, Spouse or significant other,    Type of CM/SW Visit: Initial Assessment    Primary Care Provider verified and updated as needed:     Readmission within the last 30 days:        Reason for Consult: discharge planning, end of life/hospice  Advance Care Planning:            Communication Assessment  Patient's communication style: spoken language (English or Bilingual)    Hearing Difficulty or Deaf: no   Wear Glasses or Blind: yes    Cognitive  Cognitive/Neuro/Behavioral: WDL                      Living Environment:   People in home: spouse     Current living Arrangements: house      Able to return to prior arrangements: yes       Family/Social Support:  Care provided by: self, spouse/significant other  Provides care for: no one, unable/limited ability to care for self  Marital Status:   , Children          Description of Support System: Involved, Supportive    Support Assessment: Adequate family and caregiver support    Current Resources:   Patient receiving home care services: No     Community Resources: None, Hospice  Equipment currently used at home: none  Supplies currently used at home:      Employment/Financial:  Employment Status: retired        Financial Concerns: none   Referral to Financial Worker: No       Does the patient's insurance plan have a 3 day qualifying hospital stay waiver?  No    Lifestyle & Psychosocial Needs:  Social Determinants of Health     Food Insecurity: No Food Insecurity (10/2/2023)    Received from [employer]    Food Insecurity     Worried About Running Out of Food in the Last Year: 1   Depression: Not at risk (10/2/2023)    Received from [employer]    PHQ-2     PHQ-2 TOTAL SCORE: 0   Housing Stability: Low Risk  (10/2/2023)    Received from [employer]    Housing Stability      "Unable to Pay for Housing in the Last Year: 1   Tobacco Use: High Risk (12/11/2023)    [facility]    Patient History     Smoking Tobacco Use: Some Days     Smokeless Tobacco Use: Never     Passive Exposure: Not on file   Financial Resource Strain: Low Risk  (10/2/2023)    [facility]    Financial Resource Strain     Difficulty of Paying Living Expenses: 3     Difficulty of Paying Living Expenses: Not on file   Alcohol Use: Not on file   Transportation Needs: No Transportation Needs (10/2/2023)    [facility]    Transportation Needs     Lack of Transportation (Medical): 1   Physical Activity: Not on file   Interpersonal Safety: Not on file   Stress: Not on file   Social Connections: Socially Integrated (10/2/2023)    [facility]    Social Connections     Frequency of Communication with Friends and Family: 0   Health Literacy: Not on file       Functional Status:  Prior to admission patient needed assistance:              Mental Health Status:  Mental Health Status: No Current Concerns       Chemical Dependency Status:  Chemical Dependency Status: No Current Concerns             Values/Beliefs:  Spiritual, Cultural Beliefs, Restoration Practices, Values that affect care: no               Additional Information:  CM/SW consult for discharge planning. Pt signed on to [facility] hospice on 4/30. SW met with pt and spouse. Pt and spouse live in their own home in [place]. Pt is requesting to go home. She states she \"doesn't want to die in the hospital.\" Spouse states he can assist her at home. She does not want a hospital bed at this time, but is requesting a walker, wheelchair and O2. FLORES offered choice of hospice agency. She retired from [employer] in October. She is okay with staying with [facility] at this time. FLORES left a message with " [facility] for the RN to call to assist with arranging discharge home for pt. Will arrange transportation home Thursday, when timing know. SW following.     JOVAN Garrido, [facility]  [phone] Desk phone  [phone] Cell/text (Preferred)  [facility]

## 2024-05-01 NOTE — PLAN OF CARE
Goal Outcome Evaluation: No Change   A and O x 4; 02 on per Oxymask at 5 L. Denies pain. Ativan given x2 po for anxiety. Up to BSC x1 with SBA. Turns self in bed

## 2024-05-01 NOTE — CONSULTS
"[facility]    Consult Note [facility]  _________________________________________________________________    [facility] 24/7 Contact Number: [phone]    - Providers: Please contact [facility] with changes in orders or clinical plan of care   - Nursing: Please contact [facility] with significant changes in patient condition    Hospice will notify the care team (including the hospitalist) to confirm date of inpatient hospice (GIP) admission.    New Epic encounter will not be created until hospice completes admission.   ______________________________________________________________________        Hospice Diagnosis: Chronic Obstructive Pulmonary Disease    Indication for Inpatient Hospice: Pain, Anxiety, Dyspnea    Goals for Hospital Discharge: Management of pain as evidenced by numeric pain score of less than 3 for 24 hrs in a 48-hr period; Management of anxiety as evidenced by no restlessness/fidgeting/verbal report of \"feeling anxious\" for 24 hrs in a 48-hr period; Management of dyspnea as evidenced by RR < 20 and non-labored for 24 hrs in a 48-hr period.    Plan of Care Discussed with the Following:   - Nurse: MARIANA Ramon  - Charge Nurse: N/A  - Hospitalist/Rounding Provider: Karoline Whipple MD  - Tatianna's Family/Preferred Contact: Yaya ()  - Hospice Provider:Srinivas Dominguez DO    Summary of Visit (includes assessment, medications and any new orders):     Evaluated patient with  at bedside. Patient reports wanting to go home as soon as she can. She would like to have home hospice services through [facility]. GIP team working to arrange for delivery of equipment and supplies. Tentative discharge tomorrow, 5/2/2024. Patient will need 3 days worth of medications at time of discharge. GIP team will work with attending on this.    On exam, RR 24 (shallow and labored), HR 85 (RRR). Skin is pale and hot to the touch. Patient endorses pain in chest that " "feels like a \"tight pressure.\" No edema noted. Abdomen soft and non-distended.     Medication recommendations from hospice physician, Dr. Dominguez, provided to Dr. Whipple via [facility] and include the following:    Morphine 10 mg SL Q4H (scheduled) and Q2H PRN for pain/dyspnea (RR < 20/min)    GIP hospice will continue to evaluate/assess patient daily.    Ping Owens RN, CHPN    "

## 2024-05-01 NOTE — PROGRESS NOTES
[facility]    Medicine Progress Note - Hospitalist Service    Date of Admission:  4/30/2024    Assessment & Plan     Tatianna Leo is a 68-year-old female with history of COPD, who presented on 4/28/2024 with worsening shortness of breath due to acute COPD exacerbation secondary to acute RSV infection. She was in respiratory distress on arrival and placed on BiPAP.  Patient did not tolerate BiPAP well and expressed desire to switch to comfort care/hospice. Hospice GIP was consulted and she was transitioned to hospice GIP on 4/30/2024.      Acute hypoxic and hypercapnic respiratory failure -secondary to RSV infection and COPD exacerbation  - did not tolerate BiPAP  - Continue supplemental oxygen for comfort  - morphine and ativan for comfort      Acute COPD exacerbation - due to RSV bronchitis  - Continue Breo inhaler, Incruse inhaler, DuoNebs as needed, prednisone (started 4/29)  - Continue lorazepam as needed  - continue morphine for comfort     Acute infection due to confirmed RSV  - Chest x-ray showed hyperinflated lungs.  No consolidation, effusion or pneumothorax.  - Tested positive for RSV on 4/28.    - continue Robitussin DM cough syrup      Tobacco use disorder  - continues to smoke.  Continue nicotine patch     Acute MI due to NSTEMI, type I vs type II vs focal myocarditis  Elevated troponin  - Complainted of chest pain on arrival to ER on 4/28.  Troponin were mildly elevated with flat trend (26 --> 24 --> 23).   - EKG showed sinus tachycardia, right axis and RV enlargement.  No concerning ischemic changes  - Echo 4/28 showed normal LVEF of 60-65%, small area of mid anterior hypokinesis  - Cardiology felt chest pain most likely secondary to hypoxia but due to wall motion abnormality on echo, NSTEMI microinfarction or focal myocarditis also in the differential.  Coronary angiography was recommended but deferred as patient transitioned to hospice  -  Continue aspirin 81 mg  daily  - prn sublingual nitroglycerine for chest pain            Diet: Combination Diet Regular Diet Adult    DVT Prophylaxis: Not needed as patient is on hospice  Batres Catheter: Not present  Lines: None     Cardiac Monitoring: None  Code Status: No CPR- Do NOT Intubate      Clinically Significant Risk Factors                             # COPD: noted on problem list        Disposition Plan       Medically Ready for Discharge: Anticipated in 2-4 Days             Karoline Whipple MD  Hospitalist Service  [facility]  Securely message with [employer] (more info)  Text page via [employer] Paging/Directory   ______________________________________________________________________    Interval History   Patient reports that she feels okay.  She feels comfortable.  Doing very denies pain.  Denies any significant shortness of breath.   present at bedside     Physical Exam   Vital Signs:       Pulse: 95   Resp: 26 SpO2: 95 % O2 Device: Oxymask Oxygen Delivery: 5 LPM  Weight: 0 lbs 0 oz      Constitutional - awake and alert, resting in bed, in no acute distress  Integumentary - skin is warm and dry, no rashes or ulcers  Neurological - awake, alert and oriented x3.  Moving all 4 extremities, normal speech, no focal deficits    Medical Decision Making       30 MINUTES SPENT BY ME on the date of service doing chart review, history, exam, documentation & further activities per the note.      Data         Imaging results reviewed over the past 24 hrs:   No results found for this or any previous visit (from the past 24 hour(s)).

## 2024-05-01 NOTE — PLAN OF CARE
Shift Summary ([id number]):     GIP    A&Ox4. Wearing 5 L oxymask. PRN ativan and morphine for comfort. SBA to BSC. Repositions independently in bed. Regular diet. Family at bedside. SW consulted for hospice discharge planning.

## 2024-05-02 NOTE — PROGRESS NOTES
"[facility]    Consult Note - [facility]  _________________________________________________________________    [facility] 24/7 Contact Number: [phone]    - Providers: Please contact [facility] with changes in orders or clinical plan of care   - Nursing: Please contact [facility] with significant changes in patient condition    Hospice will notify the care team (including the hospitalist) to confirm date of inpatient hospice (GIP) admission.    New Epic encounter will not be created until hospice completes admission.   ______________________________________________________________________        Hospice Diagnosis: Chronic Obstructive Pulmonary Disease     Indication for Inpatient Hospice: Pain, Anxiety, Dyspnea     Goals for Hospital Discharge: Management of pain as evidenced by numeric pain score of less than 3 for 24 hrs in a 48-hr period; Management of anxiety as evidenced by no restlessness/fidgeting/verbal report of \"feeling anxious\" for 24 hrs in a 48-hr period; Management of dyspnea as evidenced by RR < 20 and non-labored for 24 hrs in a 48-hr period.     Plan of Care Discussed with the Following:   - Nurse: MARIANA Ramon  - Hospitalist/Rounding Provider: Karoline Whipple MD  - Tatianna's Family/Preferred Contact: Yaya ()  - Hospice Provider:Srinivas Dominguez DO    Summary of Visit (includes assessment, medications and any new orders):   Saw pt today. She seemed excited to go home. Granddaughter at bedside. Pt is going home confirmed that all supplies did arrive to home with  Yaya. Transport set up by . I did update team on pt discharge out the hospital. Report given to MARIANA Oswald to visit pt in community.       Justin Calvillo RN   "

## 2024-05-02 NOTE — PLAN OF CARE
Goal Outcome Evaluation:  [id number]       Patient is under Hospice/Comfort care. Alert and oriented x 4. Stand-by assist to bedside commode. On regular diet with very low appetite. Did complain of pain 5/10, scheduled oral dose of Morphine q 4hours. On oxymask at 5Lpm. Plan to discharge home today once transportation time is figured out.

## 2024-05-02 NOTE — PROGRESS NOTES
[facility]    Social Work Progress Note - [facility]    ______________________________________________________________________    [facility]  Contact Number: [phone]    - Providers: Please contact [facility] with changes in orders or clinical plan of care   - Nursing: Please contact [facility] with significant changes in patient condition  - Social Work: Please contact [facility] for discharge phanning/updates  ______________________________________________________________________        Summary of Visit (includes psychosocial assessment/updates, acceptance of palliative care/hospice philosophy, discharge plans):   SW met with pt; pt was sleeping when SW arrived however woke up to meet with SW. SW provided introduction and role. Pt was A&Ox4; able to identify self,  and reason for admission. 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     Interventions and response to Interventions (short-term counseling, family conference, education/resources offered to the family):  SW provided active listening, verbal counseling and validation to pt's fears, guilt and family discord. SW provided education on bereavement services and support to follow pt and her family's needs. SW encouraged doing activities pt enjoys when discharged to [facility] [facility]. SW encouraged pt to discuss end of life with family to ease their anxiety. Pt will be discharging home today with [facility] hospice; SW informed pt that another SW will visit pt on-going. Pt was appreciative of SW visit and care from [facility] and hospice team.     Plan of Care Discussed with the Following:   - Nurse: MARIANA Ramon  - Hospitalist/Rounding Provider: Karoline Whipple MD    - Tatianna's Family/Preferred Contact: Haresh, spouse  - Hospice Provider: Dr. Angelica Barnes, LGSW

## 2024-05-02 NOTE — PLAN OF CARE
Pt discharged w/ home hospice. Transported by [employer] transport. PIV removed. Transported on 5 L oxymask. Pt and family verbalize understanding of discharge medications and instructions. All pt belongings sent w/ .

## 2024-05-02 NOTE — PROGRESS NOTES
Care Management Discharge Note    Discharge Date: 05/02/2024       Discharge Disposition: Hospice    Discharge Services:      Discharge DME:      Discharge Transportation:      Private pay costs discussed: Not applicable    Does the patient's insurance plan have a 3 day qualifying hospital stay waiver?  No    PAS Confirmation Code:    Patient/family educated on Medicare website which has current facility and service quality ratings: yes    Education Provided on the Discharge Plan: Yes  Persons Notified of Discharge Plans: Spouse, RN, hospice  Patient/Family in Agreement with the Plan: yes    Handoff Referral Completed: No    Additional Information:  Pt will discharge home today with [facility]. [facility] [employer]  between [id number]. PCS faxed. Nursing aware. SW updated [facility] SW and RN. Spouse also updated and in agreement. Spouse confirmed equipment has been delivered to the home, including O2.     JOVAN Garrido, [facility]  [phone] Desk phone  [phone] Cell/text (Preferred)  [facility]

## 2024-05-02 NOTE — DISCHARGE SUMMARY
[facility]  Hospitalist Discharge Summary      Date of Admission:  4/30/2024  Date of Discharge:  5/2/2024  Discharging Provider: Karoline Whipple MD  Discharge Service: Hospitalist Service    Discharge Diagnoses     Acute hypoxic and hypercapnic respiratory failure -secondary to RSV infection and COPD exacerbation     Acute COPD exacerbation - due to RSV bronchitis     Acute infection due to confirmed RSV     Tobacco use disorder     Probable acute cystitis     Acute MI due to NSTEMI, type I vs type II vs focal myocarditis      Clinically Significant Risk Factors          Follow-ups Needed After Discharge   Follow-up Appointments     Follow-up and recommended labs and tests       Follow up with primary care provider, Danny Denton, as needed        {Additional follow-up instructions/to-do's for PCP    :    Unresulted Labs Ordered in the Past 30 Days of this Admission       Date and Time Order Name Status Description    4/28/2024  1:09 AM Blood Culture Peripheral Blood Preliminary     4/28/2024  1:09 AM Blood Culture Peripheral Blood Preliminary             Discharge Disposition   Discharged to home with hospice  Condition at discharge: [facility] Course     Tatianna Leo is a 68-year-old female with history of COPD, who presented on 4/28/2024 with worsening shortness of breath.  She was in respiratory distress on arrival and placed on BiPAP.  Patient did not tolerate BiPAP and expressed desire to switch to comfort care/hospice. Hospice GIP consulted and patient she was transitioned to hospice GIP on 4/30/2024.  Her symptoms have now stabilized.  Shortness of breath and better controlled on Roxanol.  She was discharged home with hospice on 5/2/2024     Acute hypoxic and hypercapnic respiratory failure -secondary to RSV infection and COPD exacerbation  - oxygen for comfort     Acute COPD exacerbation - due to RSV bronchitis  - Continue Breo inhaler, Incruse inhaler,   - continue  prednisone for 2 more days   - Continue lorazepam and roxanol for comfort     Acute infection due to confirmed RSV  - Chest x-ray showed hyperinflated lungs.  No consolidation, effusion or pneumothorax.  - Tested positive for RSV on 4/28.    - continue Robitussin DM cough syrup      Tobacco use disorder  - continues to smoke. Was on nicotine patch in hospital     Probable acute cystitis  - UA showed 128 WBCs, large leukocyte esterase, negative nitrites  - Antibiotics discontinued as patient transitioned to hospice     Acute MI due to NSTEMI, type I vs type II vs focal myocarditis  Elevated troponin  - Complainted of chest pain on arrival to ER.  Troponin mildly elevated with flat trend, 26 --> 24 --> 23. proBNP 1652  - EKG showed sinus tachycardia, right axis and RV enlargement.  No concerning ischemic changes  - Echo 4/28 showed normal LVEF of 60-65%, small area of mid anterior hypokinesis  - Cardiology felt chest pain most likely secondary to hypoxia but due to wall motion abnormality on echo, NSTEMI microinfarction or focal myocarditis also in the differential.  Coronary angiography was recommended but deferred as patient transitioned to hospice  -  Continue aspirin 81 mg daily        Consultations This Hospital Stay   PHARMACY IP CONSULT  CARE MANAGEMENT / SOCIAL WORK IP CONSULT    Code Status   No CPR- Do NOT Intubate    Time Spent on this Encounter   I, Karoline Whipple MD, personally saw the patient today and spent greater than 30 minutes discharging this patient.       Karoline Whipple MD  [place] [address] [place] [address]  Phone: [phone]  ______________________________________________________________________    Physical Exam   Vital Signs:                    Weight: 0 lbs 0 oz    Constitutional -lying in bed, appears comfortable   Neurological - awake, alert and oriented x3.  Moving all 4 extremities, normal speech, no focal deficits       Primary Care  Physician   Danny Denton    Discharge Orders      Reason for your hospital stay    Viral infection, COPD     Follow-up and recommended labs and tests     Follow up with primary care provider, Danny Denton, as needed     Activity    Your activity upon discharge: activity as tolerated     Diet    Follow this diet upon discharge: Regular       Significant Results and Procedures   Results for orders placed or performed during the hospital encounter of 24   XR Chest Port 1 View    Narrative    EXAM: XR CHEST PORT 1 VIEW  LOCATION: [facility]  DATE: 2024    INDICATION: Shortness of breath  COMPARISON: 2021      Impression    IMPRESSION: Hyperinflated lung redemonstrated. No consolidation, pleural effusion or pneumothorax. Heart size and pulmonary vascularity are normal.   Echocardiogram Complete     Value    LVEF  60-65%    Narrative    [id number]  [id number]  [id number]  [id number]^[facility]ICE[facility]     [facility]  Echocardiography Laboratory  [address] [place] [address]     Name: ALBA HOOK  MRN: [id number]  : 1955  Study Date: 2024 10:09 AM  Age: 68 yrs  Gender: Female  Patient Location: [facility]  Reason For Study: Chest Pain  Ordering Physician: TAWANNA GALDAMEZ  Referring Physician: Danny Denton  Performed By: Alexis Bullock     BSA: 1.7 m2  Height: 66 in  Weight: 135 lb  HR: 89  BP: 120/58 mmHg  ______________________________________________________________________________  Procedure  Complete Portable Echo Adult. Optison (NDC #[id number]) given intravenously.  ______________________________________________________________________________  Interpretation Summary     Left ventricular systolic function is normal.  The visual ejection fraction is 60-65%.  Small area of mid anterior hypokinesis.  The study was technically adequate. There is no comparison study  available.  ______________________________________________________________________________  Left Ventricle  The left ventricle is normal in size. There is normal left ventricular wall  thickness. Left ventricular systolic function is normal. The visual ejection  fraction is 60-65%. Left ventricular diastolic function is indeterminate.  Small area of mid anterior hypokinesis.     Right Ventricle  The right ventricle is normal size. The right ventricular systolic function is  normal.     Atria  Normal left atrial size. Borderline right atrial enlargement.     Mitral Valve  There is trace mitral regurgitation.     Tricuspid Valve  There is trace tricuspid regurgitation. The right ventricular systolic  pressure is approximated at 25.5 mmHg plus the right atrial pressure. IVC  diameter and respiratory changes fall into an intermediate range suggesting an  RA pressure of 8 mmHg.     Aortic Valve  There is mild trileaflet aortic sclerosis. No aortic stenosis is present.     Pulmonic Valve  The pulmonic valve is not well visualized.     Vessels  The aortic root is normal size.     Pericardium  There is no pericardial effusion.     Rhythm  Sinus rhythm was noted.  ______________________________________________________________________________  MMode/2D Measurements & Calculations  IVSd: 0.74 cm     LVIDd: 3.8 cm  LVIDs: 2.4 cm  LVPWd: 0.87 cm  FS: 37.0 %  LV mass(C)d: 87.3 grams  LV mass(C)dI: 51.6 grams/m2  Ao root diam: 3.1 cm  LA dimension: 2.7 cm  asc Aorta Diam: 2.8 cm  LA/Ao: 0.85  LVOT diam: 2.0 cm  LVOT area: 3.2 cm2  Ao root diam index Ht(cm/m): 1.9  Ao root diam index BSA (cm/m2): 1.8  Asc Ao diam index BSA (cm/m2): 1.6  Asc Ao diam index Ht(cm/m): 1.7  LA Volume (BP): 42.7 ml     LA Volume Index (BP): 25.3 ml/m2  RWT: 0.45  TAPSE: 2.5 cm     Doppler Measurements & Calculations  MV E max edwin: 84.0 cm/sec  MV A max edwin: 75.4 cm/sec  MV E/A: 1.1  MV dec slope: 627.6 cm/sec2  MV dec time: 0.13 sec  PA acc time: 0.16  sec  TR max jhonny: 252.5 cm/sec  TR max P.5 mmHg  E/E' av.4  Lateral E/e': 8.7  Medial E/e': 8.1  RV S Jhonny: 17.2 cm/sec     ______________________________________________________________________________  Report approved by: Nichole John 2024 11:53 AM             Discharge Medications   Current Discharge Medication List        START taking these medications    Details   aspirin 81 MG EC tablet Take 1 tablet (81 mg) by mouth daily  Qty: 30 tablet, Refills: 0    Associated Diagnoses: Demand ischemia (H)      guaiFENesin-dextromethorphan (ROBITUSSIN DM) 100-10 MG/5ML syrup Take 10 mLs by mouth every 4 hours as needed for cough  Qty: 236 mL, Refills: 0    Associated Diagnoses: RSV infection      LORazepam (ATIVAN) 1 MG tablet Take 1 tablet (1 mg) by mouth every 6 hours as needed for anxiety  Qty: 10 tablet, Refills: 0    Associated Diagnoses: Hospice care      !! morphine sulfate, high concentrate, (ROXANOL-CONCENTRATED) 20 MG/ML concentrated solution Take 0.5 mLs (10 mg) by mouth every 4 hours  Qty: 15 mL, Refills: 0    Associated Diagnoses: Hospice care      !! morphine sulfate, high concentrate, (ROXANOL-CONCENTRATED) 20 MG/ML concentrated solution Take 0.5 mLs (10 mg) by mouth every 2 hours as needed for shortness of breath or breakthrough pain    Associated Diagnoses: Hospice care      predniSONE (DELTASONE) 20 MG tablet Take 2 tablets (40 mg) by mouth daily for 2 days  Qty: 4 tablet, Refills: 0    Associated Diagnoses: COPD exacerbation (H)      SENNA-docusate sodium (SENNA S) 8.6-50 MG tablet Take 2 tablets by mouth at bedtime  Qty: 60 tablet, Refills: 0    Associated Diagnoses: Hospice care       !! - Potential duplicate medications found. Please discuss with provider.        CONTINUE these medications which have NOT CHANGED    Details   albuterol (PROAIR HFA/PROVENTIL HFA/VENTOLIN HFA) 108 (90 Base) MCG/ACT inhaler Inhale 1-2 puffs into the lungs every 6 hours as needed for shortness of breath  / dyspnea or wheezing      fluticasone-vilanterol (BREO ELLIPTA) 100-25 MCG/ACT inhaler Inhale 1 puff into the lungs daily      tiotropium (SPIRIVA RESPIMAT) 2.5 MCG/ACT inhaler Inhale 1 puff into the lungs daily           Allergies   Allergies   Allergen Reactions    Amoxicillin Swelling

## (undated) DEVICE — SU ETHILON 3-0 PS-1 18" 1663G

## (undated) DEVICE — PREP CHLORAPREP W/ORANGE TINT 10.5ML 260715

## (undated) DEVICE — SU VICRYL 3-0 SH 27" J316H